# Patient Record
Sex: FEMALE | Race: BLACK OR AFRICAN AMERICAN | Employment: FULL TIME | URBAN - METROPOLITAN AREA
[De-identification: names, ages, dates, MRNs, and addresses within clinical notes are randomized per-mention and may not be internally consistent; named-entity substitution may affect disease eponyms.]

---

## 2020-02-03 ENCOUNTER — HOSPITAL ENCOUNTER (OUTPATIENT)
Dept: GENERAL RADIOLOGY | Age: 55
Discharge: HOME | End: 2020-02-03
Payer: COMMERCIAL

## 2020-02-03 DIAGNOSIS — M25.569 KNEE JOINT PAIN: ICD-10-CM

## 2020-02-03 PROCEDURE — 73565 X-RAY EXAM OF KNEES: CPT

## 2020-02-03 PROCEDURE — 73560 X-RAY EXAM OF KNEE 1 OR 2: CPT

## 2021-07-13 ENCOUNTER — OFFICE VISIT (OUTPATIENT)
Dept: SURGERY | Age: 56
End: 2021-07-13
Payer: COMMERCIAL

## 2021-07-13 VITALS
HEIGHT: 64 IN | RESPIRATION RATE: 18 BRPM | SYSTOLIC BLOOD PRESSURE: 177 MMHG | TEMPERATURE: 98 F | BODY MASS INDEX: 42.68 KG/M2 | HEART RATE: 77 BPM | OXYGEN SATURATION: 97 % | WEIGHT: 250 LBS | DIASTOLIC BLOOD PRESSURE: 98 MMHG

## 2021-07-13 DIAGNOSIS — M25.562 CHRONIC PAIN OF BOTH KNEES: ICD-10-CM

## 2021-07-13 DIAGNOSIS — R53.82 CHRONIC FATIGUE: ICD-10-CM

## 2021-07-13 DIAGNOSIS — E55.9 VITAMIN D DEFICIENCY: ICD-10-CM

## 2021-07-13 DIAGNOSIS — R01.1 HEART MURMUR: ICD-10-CM

## 2021-07-13 DIAGNOSIS — R63.5 EXCESSIVE WEIGHT GAIN: ICD-10-CM

## 2021-07-13 DIAGNOSIS — E03.9 ACQUIRED HYPOTHYROIDISM: ICD-10-CM

## 2021-07-13 DIAGNOSIS — G89.29 CHRONIC PAIN OF BOTH KNEES: ICD-10-CM

## 2021-07-13 DIAGNOSIS — D53.9 ANEMIA ASSOCIATED WITH NUTRITIONAL DEFICIENCY: ICD-10-CM

## 2021-07-13 DIAGNOSIS — E66.01 CLASS 3 SEVERE OBESITY DUE TO EXCESS CALORIES WITH SERIOUS COMORBIDITY AND BODY MASS INDEX (BMI) OF 40.0 TO 44.9 IN ADULT (HCC): Primary | ICD-10-CM

## 2021-07-13 DIAGNOSIS — M79.89 LEG SWELLING: ICD-10-CM

## 2021-07-13 DIAGNOSIS — E78.00 HIGH CHOLESTEROL: ICD-10-CM

## 2021-07-13 DIAGNOSIS — S89.92XA INJURY OF LEFT KNEE, INITIAL ENCOUNTER: ICD-10-CM

## 2021-07-13 DIAGNOSIS — I10 ESSENTIAL HYPERTENSION: ICD-10-CM

## 2021-07-13 DIAGNOSIS — M25.561 CHRONIC PAIN OF BOTH KNEES: ICD-10-CM

## 2021-07-13 DIAGNOSIS — E78.5 DYSLIPIDEMIA: ICD-10-CM

## 2021-07-13 DIAGNOSIS — G47.33 OBSTRUCTIVE SLEEP APNEA SYNDROME: ICD-10-CM

## 2021-07-13 PROCEDURE — 99205 OFFICE O/P NEW HI 60 MIN: CPT | Performed by: FAMILY MEDICINE

## 2021-07-13 RX ORDER — MELOXICAM 15 MG/1
15 TABLET ORAL DAILY
COMMUNITY
End: 2021-10-08

## 2021-07-13 RX ORDER — ESTRADIOL 0.5 MG/1
0.5 TABLET ORAL DAILY
COMMUNITY
End: 2021-10-08

## 2021-07-13 RX ORDER — CHOLECALCIFEROL TAB 125 MCG (5000 UNIT) 125 MCG
5000 TAB ORAL DAILY
COMMUNITY
End: 2021-08-18 | Stop reason: DRUGHIGH

## 2021-07-13 RX ORDER — ATORVASTATIN CALCIUM 20 MG/1
20 TABLET, FILM COATED ORAL DAILY
COMMUNITY

## 2021-07-13 RX ORDER — DICLOFENAC SODIUM 10 MG/G
GEL TOPICAL
COMMUNITY
Start: 2021-06-24 | End: 2022-05-29

## 2021-07-13 RX ORDER — LOSARTAN POTASSIUM 50 MG/1
50 TABLET ORAL DAILY
COMMUNITY
End: 2021-11-15

## 2021-07-13 RX ORDER — FUROSEMIDE 40 MG/1
60 TABLET ORAL DAILY
COMMUNITY
End: 2021-10-08

## 2021-07-13 RX ORDER — LEVOTHYROXINE SODIUM 175 UG/1
175 TABLET ORAL DAILY
COMMUNITY

## 2021-07-13 NOTE — LETTER
NOTIFICATION RETURN TO WORK / SCHOOL    7/13/2021 11:21 AM    Ms. Karla Robert  [address] [id number]      To Whom It May Concern:    Karla Robert is currently under the care of Chester Jackson    She will return to work/school on: 7/13/2021    If there are questions or concerns please have the patient contact our office.         Sincerely,      Linda Walton, DO

## 2021-07-13 NOTE — PATIENT INSTRUCTIONS
Grief (Actual/Anticipated): Care Instructions  Your Care Instructions     Grief is your emotional reaction to a major loss. The words \"sorrow\" and \"heartache\" often are used to describe feelings of grief. You feel grief when you lose a beloved person, pet, place, or thing. It is also natural to feel grief when you lose a valued way of life, such as a job, marriage, or good health. You may begin to grieve before a loss occurs. You may grieve for a loved one who is sick and dying. Children and adults often feel the pain of loss before a big move or divorce. This type of grief helps you get ready for a loss. Grief is different for each person. There is no \"normal\" or \"expected\" period of time for grieving. Some people adjust to their loss within a couple of months. Others may take 2 years or longer, especially if their lives were changed a lot or if the loss was sudden and shocking. Grieving can cause problems such as headaches, loss of appetite, and trouble with thinking or sleeping. You may withdraw from friends and family and behave in ways that are unusual for you. Grief may cause you to question your beliefs and views about life. Grief is natural and does not require medical treatment. But if you have trouble sleeping, it may help to take sleeping pills for a short time. It may help to talk with people who have been through or are going through similar losses. You may also want to talk to a counselor about your feelings. Talking about your loss, sharing your cares and concerns, and getting support from others are important parts of healthy grieving. Follow-up care is a key part of your treatment and safety. Be sure to make and go to all appointments, and call your doctor if you are having problems. It's also a good idea to know your test results and keep a list of the medicines you take. How can you care for yourself at home? · Get enough sleep. Your mind helps make sense of your life while you sleep. Missing sleep can lead to illness and make it harder for you to deal with your grief. · Eat healthy foods. Try to avoid eating only foods that give you comfort. Ask someone to join you for a meal if you do not like eating alone. Consider taking a multivitamin every day. · Get some exercise every day. Even a walk can help you deal with your grief. Other exercises, such as yoga, can also help you manage stress. · Comfort yourself. Take time to look at photos or use special items that make you feel better. · Stay involved in your life. Do not withdraw from the activities you enjoy. People you know at work, [employer], clubs, or other groups can help you get through your period of grief. · Think about joining a support group to help you deal with your grief. There are many support groups to help people recover from grief. When should you call for help? Call 911 anytime you think you may need emergency care. For example, call if:    · You feel you cannot stop from hurting yourself or someone else. Watch closely for changes in your health, and be sure to contact your doctor if:    · You think you may be depressed.     · You do not get better as expected. Where can you learn more? Go to http://www.gray.com/  Enter H249 in the search box to learn more about \"Grief (Actual/Anticipated): Care Instructions. \"  Current as of: July 17, 2020               Content Version: 12.8  © 5227-0162 [employer]. Care instructions adapted under license by [employer] (which disclaims liability or warranty for this information). If you have questions about a medical condition or this instruction, always ask your healthcare professional. [place] [address] any warranty or liability for your use of this information. Congratulations on starting the Low Calorie Diet!      Consume 2-3 meal replacements a day and 1 \"grocery\" meal.  The \"grocery\" meal should primarily consist of protein and green vegetables. Please meet with the dietician to learn how to calculate your total calories daily and/or use one of the suggested Mobile Apps listed in your patient instructions today. Your total calories consumed each day should not exceed 1,000-1,200 kcalories. 1. If you experience any new syptoms once you start this dietary approach, refer to your [facility] Program  Patient Manual for a list of all potential side effects of the LCD and recommendations for what to do to improve or resolved the negative side effect. For constipation, do not allow more than 3 days to pass you without having a bowel movement. As mentioned at your provider monthly visit, you can try taking OTC Magnesium 400 mg at bedtime for muscle cramping, difficulty sleeping or constipation or try Milk of Magnesia, Miralax, or Smooth Move Tea for constipation. If you have kidney disease, try OTC Colace instead. Please make sure you are consuming a minimal of 2 liter (67 oz) and up to half your body weight in ounces of water every day to reduce risk experiencing the potential negative side effects. 2. If you experience new dizziness and have consumed your proper water intake, you may drink one 8 oz coffee mug of broth or a bouillon cube in water. 3. Remember to get your labs drawn with your preferred laboratory one week prior to your 3rd monthly visit with your provider. 4. Attend the required nurse triage weigh-ins every other week at the office. Make sure homework sheets are completed prior to arrival or you will not be seen and cannot  meal products. 5. Have a reliable scale and try to use the same scale each time you weigh at home. Best weights are yielded when you weight without clothing or shoes and measure before the first meal of the day. 6. Attend the weekly nutritional meetings on Thursdays with the dietician virtually as scheduled.      7.  If you plan to schedule your next 2 appointments with your provider using a virtual platform, please have a reliable blood pressure cuff and scale available. You will be asked to report your weight, blood pressure and heart rate at the time of checking in with the nurse that day. [address], , at [phone] with any questions or concerns related to the program.       Again, WELCOME TO [facility] [place]! We are thrilled you have chosen us to take this weight loss journey with you and honored to do so. We look forward to working very closely with you as you achieve a healthier life. Many blessings! WEIGHT LOSS PLAN    1. Read food labels and count calories. Goal 3621-3850 calories daily for women and 0858-2339 calories daily for men. Achieve this by decreasing caloric intake by 500 calories by weekly increments. NOTE:  1 pound = 3500 calories! [email or web address]  [email or web address]  [email or web address]  [email or web address] gov  Weight watchers mobile    Calories needed to lose 1-2 pounds a week = 10 x your weight in pounds    2. Increase water intake. Per [facility], it is important to drink 1/2 your body weight in ounces of water daily. Decrease your water consumption 2-3 hours before bedtime to prevent sleep disturbance from frequent urination. 3.  Decrease sugary beverages. Each can or glass of soda increases your risk of obesity by 60%. Can lose 10 pounds in a month by avoiding any soda. 12 oz can of soda = 140 calories, 16 oz cup of sweet tea = 200 calories    16 oz orange juice = 200 calories, 10 oz apple juice = 150 calories   32 oz sports drink = 200 calories, 16 oz punch = 240 calories   3.5 oz alcohol = 100-150 calories     4. Avoid High Fructose Corn Syrup products. This ingredient makes products highly addictive! 5. Exercise 30 minutes daily 5 days weekly, minimally. If you burn 3500 calories that equals a pound!   Use a pedometer to count steps. Visit www. [employer] for a free pedometer and diet recommendations. Your maximum heart rate = 220 - your age    Never exercise at your maximum heart rate. See handout for target heart rate. 6.  Decrease carbohydrates (white bread, pasta, rice, potatoes, sweet foods and sweet drinks like soda, tea, coffee, juice and sports drinks). Increase fiber and protein. Goal:   calories daily of carbohydrates     Try CHROMIUM PICONATE 200 MG THREE TIMES DAILY,    to decrease Premenstrual carbohydrate cravings. 7.  Eat 3-5 small meals daily, include lots of protein (beans/legumes, nuts, lean meat, eggs) and green vegetables with each. (Breakfast, lunch, dinner with 2 healthy snacks)    8. Get proper rest 7-8 hours uninterrupted. When you get less than 6 hours, it triggers hunger by affecting your Grehlin:Leptin ratio and this results in weight gain. 9.  Watch your food portions. Green leafy vegetables should cover 1/2 of the plate, lean meat 1/4 of the plate, starchy vegetable 1/4 of the plate. Use smaller plates. 10.  Do not eat until you are full. Eat until you are no longer hungry. If you are not sure, try drinking a glass of water before getting your second serving of food. 11.  Do not weigh yourself daily. Wait until your next office visit. Use how you feel and  how your clothes fit as measurements of success. 12.  Address your spirituality to draw strength from above during your journey. Remember \"I am fearfully and wonderfully made. Marvelous in His eyes. \"    13. Set realistic, appropriate and achievable weight loss goals:     RECOMMENDED TARGET WEIGHT LOSS:  Initial weight loss of 5-10% of your initial body weight achieved over 6 months or a decrease of 2 BMI units. MINIMUM GOAL OF WEIGHT LOSS:  Reduce body weight and maintain a lower body weight. Prevent weight gain. RELATED WEBSITES:      Www.obesityaction. org  (and consider joining the Obesity [employer] Coalition for $25/year. The [address] mission is to elevated and empower those affected by obesity through education, advocacy and support. Quarterly journals included in membership fee. )    Www.[employer]. TrackR  www.[employer].com     RELATED DIETS:  Dr. [facility] Weight loss challenge, Mediterranean diet, [facility] Watchers, Leatha Diet (anti-inflammatory diet)

## 2021-07-26 ENCOUNTER — CLINICAL SUPPORT (OUTPATIENT)
Dept: SURGERY | Age: 56
End: 2021-07-26

## 2021-07-26 VITALS
SYSTOLIC BLOOD PRESSURE: 150 MMHG | HEIGHT: 64 IN | BODY MASS INDEX: 43.24 KG/M2 | WEIGHT: 253.3 LBS | DIASTOLIC BLOOD PRESSURE: 78 MMHG

## 2021-07-26 DIAGNOSIS — Z76.89 ENCOUNTER FOR WEIGHT MANAGEMENT: Primary | ICD-10-CM

## 2021-07-26 LAB
25(OH)D3+25(OH)D2 SERPL-MCNC: 22.6 NG/ML (ref 30–100)
ALBUMIN SERPL-MCNC: 4.4 G/DL (ref 3.8–4.9)
ALBUMIN/GLOB SERPL: 1.8 {RATIO} (ref 1.2–2.2)
ALP SERPL-CCNC: 99 IU/L (ref 48–121)
ALT SERPL-CCNC: 31 IU/L (ref 0–32)
AST SERPL-CCNC: 25 IU/L (ref 0–40)
BILIRUB SERPL-MCNC: 0.8 MG/DL (ref 0–1.2)
BUN SERPL-MCNC: 11 MG/DL (ref 6–24)
BUN/CREAT SERPL: 12 (ref 9–23)
CALCIUM SERPL-MCNC: 9.7 MG/DL (ref 8.7–10.2)
CHLORIDE SERPL-SCNC: 103 MMOL/L (ref 96–106)
CHOLEST SERPL-MCNC: 218 MG/DL (ref 100–199)
CHOLEST SERPL-MCNC: 229 MG/DL (ref 100–199)
CO2 SERPL-SCNC: 25 MMOL/L (ref 20–29)
CREAT SERPL-MCNC: 0.89 MG/DL (ref 0.57–1)
ERYTHROCYTE [DISTWIDTH] IN BLOOD BY AUTOMATED COUNT: 14.3 % (ref 11.7–15.4)
FOLATE SERPL-MCNC: 6.4 NG/ML
GLOBULIN SER CALC-MCNC: 2.5 G/DL (ref 1.5–4.5)
GLUCOSE SERPL-MCNC: 89 MG/DL (ref 65–99)
HBA1C MFR BLD: 5.4 % (ref 4.8–5.6)
HCT VFR BLD AUTO: 39.6 % (ref 34–46.6)
HDL SERPL-SCNC: 32.9 UMOL/L
HDLC SERPL-MCNC: 46 MG/DL
HDLC SERPL-MCNC: 49 MG/DL
HGB BLD-MCNC: 13.1 G/DL (ref 11.1–15.9)
INSULIN SERPL-ACNC: 22.6 UIU/ML (ref 2.6–24.9)
IRON SERPL-MCNC: 57 UG/DL (ref 27–159)
LDL SERPL QN: 20.6 NM
LDL SERPL-SCNC: 2240 NMOL/L
LDL SMALL SERPL-SCNC: 1327 NMOL/L
LDLC SERPL CALC-MCNC: 153 MG/DL (ref 0–99)
LDLC SERPL CALC-MCNC: 162 MG/DL (ref 0–99)
LP-IR SCORE SERPL: 59
MAGNESIUM SERPL-MCNC: 2 MG/DL (ref 1.6–2.3)
MCH RBC QN AUTO: 27 PG (ref 26.6–33)
MCHC RBC AUTO-ENTMCNC: 33.1 G/DL (ref 31.5–35.7)
MCV RBC AUTO: 82 FL (ref 79–97)
NT-PROBNP SERPL-MCNC: 140 PG/ML (ref 0–249)
POTASSIUM SERPL-SCNC: 4.5 MMOL/L (ref 3.5–5.2)
PROT SERPL-MCNC: 6.9 G/DL (ref 6–8.5)
RBC # BLD AUTO: 4.85 X10E6/UL (ref 3.77–5.28)
SODIUM SERPL-SCNC: 140 MMOL/L (ref 134–144)
T4 FREE SERPL-MCNC: 1.91 NG/DL (ref 0.82–1.77)
THYROPEROXIDASE AB SERPL-ACNC: 9 IU/ML (ref 0–34)
TRIGL SERPL-MCNC: 103 MG/DL (ref 0–149)
TRIGL SERPL-MCNC: 103 MG/DL (ref 0–149)
TSH SERPL DL<=0.005 MIU/L-ACNC: 0.03 UIU/ML (ref 0.45–4.5)
URATE SERPL-MCNC: 4.2 MG/DL (ref 3–7.2)
VIT B12 SERPL-MCNC: 304 PG/ML (ref 232–1245)
VLDLC SERPL CALC-MCNC: 19 MG/DL (ref 5–40)
WBC # BLD AUTO: 7.9 X10E3/UL (ref 3.4–10.8)

## 2021-07-26 NOTE — LETTER
NOTIFICATION RETURN TO WORK / SCHOOL    7/26/2021 10:05 AM    Ms. Obinna Norton  [address] [address]      To Whom It May Concern:    Obinna Norton is currently under the care of Chester Jackson. She will return to work/school on: 07/26/2021    If there are questions or concerns please have the patient contact our office.         Sincerely,      Jimmy Boucher M.D.

## 2021-07-26 NOTE — PROGRESS NOTES
1. Have you been to the ER, urgent care clinic since your last visit? Hospitalized since your last visit? No     2. Have you seen or consulted any other health care providers outside of the [address] [place] [address] since your last visit? Include any pap smears or colon screening. No     Patient aware of BP just started taking BP medication a few days ago. Patient did not have homework sheets.

## 2021-07-31 NOTE — PROGRESS NOTES
[address] [address] [address] [place] [address] 22.  [phone] o  [phone] f    INITIAL PHYSICIAN VISIT    PCP:   VERONIKA oGnzalez  Referred by:  [facility]/Dr. Antoinette Iglesias    Patient Status: New  Patient's weight management approach preference today:  LCD    HISTORY OF PRESENT ILLNESS  Agree with nurse notes. Alexandrea Mota is a 49 y.o. female with Obesity Class 3 Body mass index is 42.91 kg/m². presents for evaluation and treatment. How did you hear about this weight management program: She needs BL TKR. Must get her BMI lower to have it done. Dr. Blanquita Buenrostro referred her to our program.  Are you ready to start participating in this weight loss journey and reason(s) why: yes, \"I need knee surgery and want to get back to myself. \"  Any potential unsupportive people in your life: yes    Goals for participation in weight management program:   175 lbs and to have knee surgery    Weight Loss History  Lowest weight in lifetime/date: size 6 (down from size 14) in   Highest weight in lifetime/date:   254 lbs, 50 yo  Starting weight today:  250 lbs    Number of previous weight loss attempts: 2  Previous weight loss programs: self imposed, Weight Watchers and lost 40 lbs by drinking Smoothies, exercising 2x daily x 1 hour, stopped drinking Pepsi  Which were most successful/why: as above  Contributing factors to weight gain:  Stress, new health conditions like Hypothyroid after thyroid was removed because of benign nodules in . With birth of her son her weight increased from 150 lbs to 200 lbs and continues to increase . Had worked hard to get weight down to 185 lbs in  but re-gained weight once I injured by knee in 2016 and could no longer exercise.     Past Surgical History:   Procedure Laterality Date    HX BUNIONECTOMY      HX  SECTION      HX COLONOSCOPY 2018    [facility]. w colon polypectomy. Due q 5 yrs.  HX HYSTERECTOMY  2017    HX THYROIDECTOMY  2006    due to benign nodules. Previous Weight Loss Surgery:  no Date of Surgery:  na    Family History  Has anyone in your family had weight loss surgery: no  Do you have a family history of Obesity- yes, Kidney stones- no, Gallstones- no, Diabetes- yes, Heart Disease- yes, Sleep Apnea-  no, Gout- yes  Family History   Problem Relation Age of Onset    Heart Disease Mother         CHF    Hypertension Mother     Diabetes Father     Heart Disease Father     Hypertension Father     Gout Father     Heart Attack Father 80    Diabetes Paternal Grandfather     Obesity Paternal Grandmother     Heart Disease Sister         heart palpitations    No Known Problems Maternal Grandmother     No Known Problems Maternal Grandfather        Associated Medical Conditions  Patient denies any contraindications to participation in LCD or VLCD including: history of MI in the last 3 months, Type 1 DM, Type 2 DM, Liver or Kidney disease requiring protein restriction, Recent treatment for Cancer, History of Uric-acid Kidney Stone, Gout, Gall stones, Recent onset of Inflammatory Bowel Disease, or severe Food Allergies. Past Medical History:   Diagnosis Date    Anemia 2016    due to fibroids.     Asthma     Degenerative arthritis of knee, bilateral     Dr. Keshia Long High cholesterol     Hypertension 2014    Left knee injury 2016    Dr. Lauren Che    Leg swelling     Bilaterally    Sleep apnea     on cpap    Uterine fibroid     Vitamin D deficiency        Behavioral Health History  DEPRESSION SCREENING:    3 most recent PHQ Screens 7/26/2021   Little interest or pleasure in doing things Not at all   Feeling down, depressed, irritable, or hopeless -   Total Score PHQ 2 -       Any history of mental health conditions (including Depression, Anxiety, Anorexia, Bulimia or Binge Eating disorder): no  Treating provider and medication(s):na  Is it controlled: na  History of drug abuse or dependence:  no    Do you have any current major lifestyle changes or stressors:   Yes. \"Today is my mom's birthday. She  18 due to CHF. Stepfather's anniversary death is tomorrow. \"  Move from [place] to [facility], [address] [facility] in 2016. Conflict w an employee at work yesterday. Weight Loss Medication History  Previous prescription, OTC weight loss medications or herbal remedies/supplements: yes, OTC in . Pt can not recall the name. Negative side effects: 0  Pounds lost with use:  ? lbs    Current Outpatient Medications   Medication Sig    levothyroxine (SYNTHROID) 175 mcg tablet Take 175 mcg by mouth daily.  diclofenac (VOLTAREN) 1 % gel Apply  to affected area three (3) times daily as needed.  atorvastatin (LIPITOR) 20 mg tablet Take 20 mg by mouth daily. Indications: high cholesterol    furosemide (LASIX) 40 mg tablet Take 60 mg by mouth daily. For leg swelling    cholecalciferol (VITAMIN D3) (5000 Units/125 mcg) tab tablet Take 5,000 Units by mouth daily.  estradioL (ESTRACE) 0.5 mg tablet Take 0.5 mg by mouth daily.  meloxicam (MOBIC) 15 mg tablet Take 15 mg by mouth daily.  losartan (COZAAR) 50 mg tablet Take 50 mg by mouth daily. No current facility-administered medications for this visit. Medications that cause weight gain:  Estrace, Mobic, Cozaar  Medications that cause weight loss:  0  Recent changes to medications:  Saw pcp for hypothyroid, Synthroid dose was reduced from 212 mg daily to 175 mg day. Tolerating it well. Pt stopped taking Losartan.     Allergies   Allergen Reactions    Ciprofloxacin Other (comments)     Pain     Doxycycline Other (comments)     Stomach pain    Erythromycin Other (comments)     Stomach pain    Esgic [Butalbital-Acetaminophen-Caff] Other (comments)     Stomach pain       Mobile Apps used for meals, water consumption, sleep, physical activity: 0    Eating Habits  Total calories consumed per day:  ? kcal    Number of meals consumed per day on average: 2  Number of times per week fast food or meals at/from restaurants is consumed: 5    Typical Meals:       Breakfast: Starbucks Ham and swiss croissant w caramel ribbon crunch or Jovon latte      Lunch:  Chicken and green beans      Dinner: skip due to exhaustion      Snacks: \"I am a snackaholic. \"  Salt and pepper chips, pretzels    Barriers to eating healthy meals:  Exhaustion. Drinking Habits  How much water do you consume daily: 4 16.9 oz/day    How much caffeine do you drink daily: 2 16 oz Pepsi/day   Alcohol use:  Socially, maybe once q 2 months   Any other sugar-sweetened beverages daily (sodas, teas, juices, etc.): 0  Barriers to consuming at least 2 L water daily: busy at work, no set lunch time, not grocery shopping     Social History     Tobacco Use    Smoking status: Passive Smoke Exposure - Never Smoker    Smokeless tobacco: Never Used    Tobacco comment: lived with smoker mom x years til 2009   Vaping Use    Vaping Use: Never used   Substance Use Topics    Alcohol use: Yes     Comment: occasionaly    Drug use: Never         Sleep Habits  How many hours of sleep nightly: 7 hours  Rx or OTC Sleep aids used:  0  Occupation:  [employer]  Do you work night shift:  no  Work hours:  7:15a-8/8:30p  Any eating while asleep:  no  Any snoring:  no  Daytime naps:  no  Any diagnosis of Sleep Apnea by a physician:   Yes since 2017   Do you use a CPAP machine? yes  Sleep Medicine Specialist:  Pulmonologist  Barriers to getting proper rest:  0    Exercise History  Type of physical activity/ preferred time of day:  Walking on treadmill  Physical Activity is performed 0 days a week for 0 minutes 0 time(s) a day.     Do you own a pedometer or fitness tracker: no   Average number steps per day:  ?000  Gym membership owned:  no Actively utilizing the gym:  no  Pets owned:  ?  Is physical activity enjoyable: Na    Have you ever been told by a physician or anyone else not to exercise: no  Barriers limiting physical activity:  BL knee pain    OB/GYN History (For Female Patients)  Social History     Substance and Sexual Activity   Sexual Activity Yes    Partners: Male    Birth control/protection: Surgical     OB History        1    Para        Term                AB        Living   1       SAB        TAB        Ectopic        Molar        Multiple        Live Births   1               Contraception:  NEENA  Menopause:  surgical     Are you pregnant or planning on becoming pregnant within 6 months:  No      Other Medical Care and Concerns  Pt has his of hypothyroid disorder. PCP is managing it. Reduced her Synthroid from 212 mg to 175 mg daily. Pt has a hx of Hypertension and BL leg swelling, worse with standing. She stopped Losartan and Lasiz 60 mg daily x 1 yr when her conditions stabilized after her thyroid was removed. She has a hx of vit D deficiency. Txd with supplementation. She has a hx of BL knee pain due to severe OA. Sees Dr. Rosangela Storm on 21. Txd w Voltaren and PT. She has a hx of L knee injury. Do you have upcoming travel in the next 6 weeks:  Yes. To Son's 21st birthday and [place] trip. If so, contact the dietician for meal plan modification and recommendations. Immunization History   Administered Date(s) Administered    COVID-19, PFIZER, MRNA, LNP-S, PF, 30MCG/0.3ML DOSE 2021, 2021       Health Maintenance  A1C:  SEE RESULTS BELOW. Lipid panel:  SEE RESULTS BELOW. Colonoscopy:    w colon polypectomy, due q 5 yrs  Depression screening:  Performed today  Mammogram:  ?  Annual Wellness Visit:  To be performed by pcp annually, when appropriate. ROS:  Review of Systems negative except as noted above in HPI.     PHYSICAL EXAMINATION    Visit Vitals  BP (!) 177/98 (BP 1 Location: Right arm, BP Patient Position: Sitting) Comment: Stopped taking BP medications   Pulse 77   Temp 98 °F (36.7 °C) (Oral)   Resp 18   Ht 5' 4\" (1.626 m)   Wt 250 lb (113.4 kg)   SpO2 97%   BMI 42.91 kg/m²          Neck Circumference:  15 in - Acceptable range for M >16 inches, F>15 inches  Waist Circumference:  42 in - Acceptable range for M> 40 inches/102 cm, F > 35 inches, 88 cm  Body Fat: 46.6% - Acceptable range for M 18-24%, F 25-31%      General appearance - Well nourished. Well appearing. Well developed. No acute distress. Obese. Head - Normocephalic. Atraumatic. Eyes - pupils equal and reactive. Extraocular eye movements intact. Sclera anicteric. Mildly injected sclera. Ears - Hearing is grossly normal bilaterally. Nose - normal and patent. No polyps noted. No erythema. No discharge. Mouth - mucous membranes with adequate moisture. Posterior pharynx normal with cobblestone appearance. No erythema, white exudate or obstruction. Neck - supple. Midline trachea. No carotid bruits noted bilaterally. No thyromegaly noted. Chest - clear to auscultation bilaterally anteriorly and posteriorly. No wheezes. No rales or rhonchi. Breath sounds are symmetrical bilaterally. Unlabored respirations. Heart - normal rate. Regular rhythm. Normal S1, S2.  3/6 murmur noted. No rubs, clicks or gallops noted. Abdomen - soft and distended. No masses or organomegaly. No rebound, rigidity or guarding. Bowel sounds normal x 4 quadrants. No tenderness noted. Neurological - awake, alert and oriented to person, place, and time and event. Cranial nerves II through XII intact. Clear speech. Muscle strength is +5/5 x 4 extremities. Sensation is intact to light touch bilaterally. Steady gait. Heme/Lymph - peripheral pulses normal x 4 extremities. Trace nonpitting BL leg peripheral edema is noted. Negative Dallin sign BL. Musculoskeletal - Intact x 4 extremities. Full ROM x 2 extremities, decreased in BL LEs. Mild BL knee pain with movement.     Back exam - normal range of motion. No pain on palpation of the spinous processes in the cervical, thoracic, lumbar, sacral regions. No CVA tenderness. No buffalo hump noted. Skin - no rashes, erythema, ecchymosis, lacerations, abrasions, suspicious moles noted. No skin tags or moles. No acanthosis nigricans noted in the axilla or neck. Psychological -   normal behavior, dress and thought processes. Good insight. Good eye contact. Normal affect. Appropriate mood. Normal speech. DATA REVIEWED     Lab Results:  Not available. Pt will get them from her pcp and specialists. EKG: Not available    ASSESSMENT     ICD-10-CM ICD-9-CM    1. Class 3 severe obesity due to excess calories with serious comorbidity and body mass index (BMI) of 40.0 to 44.9 in adult ([facility] [address]  E66.01 278.01     Z68.41 V85.41    2. Essential hypertension  I10 401.9 EKG, 12 LEAD, INITIAL      TSH 3RD GENERATION      URIC ACID    due to job stress yesterday and grief today, worse, usually stable without Losartan x 1 yr   3. Chronic fatigue  R53.82 780.79 EKG, 12 LEAD, INITIAL      CBC W/O DIFF      METABOLIC PANEL, COMPREHENSIVE      MAGNESIUM      VITAMIN B12 & FOLATE      VITAMIN D, 25 HYDROXY      NT-PRO BNP    due to stress vs thyroid fluctuations vs vit D vs other   4. Dyslipidemia  E78.5 272.4 HEMOGLOBIN A1C WITH EAG      INSULIN      LIPID PANEL      METABOLIC PANEL, COMPREHENSIVE   5. Excessive weight gain  R63.5 783.1 EKG, 12 LEAD, INITIAL      INSULIN      THYROID PEROXIDASE (TPO) AB    due to pregnancy vs food choices vs decreased physical activity vs thyroid vs vit D vs other   6. High cholesterol  E78.00 272.0 LIPID PANEL      NMR LIPOPROFILE WITH LIPIDS (WITHOUT GRAPH)   7. Obstructive sleep apnea syndrome  G47.33 327.23     stable on CPAP   8. Leg swelling  M79.89 729.81 NT-PRO BNP    Bilateral due to BP vs JEY vs BL knee OA vs other, worse with standing at work   9. Vitamin D deficiency  E55.9 268.9 VITAMIN D, 25 HYDROXY   10.  Anemia associated with nutritional deficiency  D53.9 281.9 IRON      VITAMIN B12 & FOLATE   11. Injury of left knee, initial encounter  S89. 92XA 959.7    12. Chronic pain of both knees  M25.561 719.46     M25.562 338.29     G89.29      due to BL OA   13. BMI 40.0-44.9, adult ([address]  Z68.41 V85.41    14. Acquired hypothyroidism  E03.9 244.9 T4, FREE      TSH 3RD GENERATION   15. Heart murmur  R01.1 785. 2        WEIGHT MANAGEMENT PLAN    Chart was reviewed and updated during the office visit today. Based on patient history, lab results, EKG and physical exam performed today in our office, Alexandrea Mota is a good candidate for the [employer] [facility] mSeller Management Program using the [employer] New Direction meal replacements for Low Calorie Dietary approach (0445-9984 kcal daily.)     Welcomed patient to our [employer] Program.      During this visit, patient met with Jeffrey Jacobs,  today to review and sign [facility] Commitment Form, Attendance Policy, and [facility] Agreement and Consent, further discuss program design, assist patient with ordering New Direction meal replacements and schedule initial 1:1 visit with dietician, mandatory weekly (VLCD 800 kcal/day)or biweekly (LCD/3903-9187 kcal/day) nurse visits. SEE SCANNED DOCUMENTS. Referred patient to Jenney Sandifer, RD for initial 30 minute 1:1 nutritional counseling, include discussions about how to count daily calories and stay within the recommended amount per day as agreed upon today, simple vs complex carbohydrates. Referred patient to [employer] Patient Manual to become familiar with potential side effects of this dietary approach and what to do if such symptoms occur. Encouraged patient to notify our office if any new symptoms develop and persist.      Discussed the patient's BMI, anthropometrics and goals.   The weight management follow up plan is as follows:     Dietary Prescription:  Ok to gradually start LCD (low calorie diet) with 6648-2454 calories consumed daily using 2-3 [employer] New Direction meal replacements after first slowly weaning off simple carbohydrates. Decrease carbohydrates gradually during first couple of weeks:  (white foods like potatoes, rice, pasta, bread, sweet foods, and sweet drinks including alcohol and Pepsi). Increase green leafy vegetables and protein (lean meats and legumes) with each meal.  Avoid fried foods and limit saturated fats. If blood pressure is elevated, limit sodium intake including hot sauce and soy sauce. Do not skip meals. Do not wait longer than 3-4 hours between meals to avoid experiencing possible low blood sugar episodes. Consume a minimum of 2 L (67 oz) of water daily up to half your body size in ounces of water, while utilizing this dietary approach. Avoid sugar-sweetened beverages. Limit caffeine, will allow 1 8 oz cup of black coffee or green tea (to stimulate release of Adiponectin and promote fat burning.)     Exercise Prescription: Minimal and as tolerated while using this dietary approach. Encouraged strength training, resistance or toning exercises daily to prevent muscle mass loss. Sleep Prescription: A goal of 7-9 hours of uninterrupted sleep is recommended to turn off the Grehlin hormone to be released from the stomach and triggers appetite while promoting weight gain. Proper rest turns on Leptin hormone to be released from white adipose tissue and promotes weight loss. Discussed snoring, symptoms of Sleep Apnea and improvements with weight loss. If you currently use CPAP or BiPAP, please continue its use whenever resting. Avoid caffeine 6-12 hours before bedtime. Limit screen time 1-2 hours before bedtime. Avoid exercising 2-3 hours before bedtime. Ok to try OTC Sleepy Time Tea, Chamomile tea or Magnesium 400 mg at bedtime for sleep, if receiving less than 7-8 hours nightly.     Emphasized the importance of patient continuing care from current primary care provider and other specialists while participating in this weight management program.  Patient has full access to all our office notes, orders, lab results on [facility] and can provide them copies, if desired. Advised patient to follow up with pcp for any acute symptoms and Health Maintenance. Continue current medications as directed by prescribers. Re-start Losartan daily since bp is elevated. Follow up with pcp if elevated bp persists or worsens. Identified and discussed weight positive and weight negative medications on patient's medication list.  Advised patient not to stop any use without discussing with the prescribing provider. Will monitor patient's weight and health with use. Supplement recommendations:  Start OTC Magnesium 400 mg po at night prn sleep, muscle cramping, constipation. Start OTC vit B12 1000 mcg daily orally if taking Metformin or experiencing numbness and tingling. Start OTC Fish Oil with EPA and DHA 1000 mg daily if experiencing dry skin, hair loss or low HDL. Use with caution if history of heartburn exists. Increase fiber supplements or try Fiber products if experiencing constipation. Take OTC Lactaid with meal replacements, if lactose intolerance history exists or symptoms begin. Refer to [facility] Patient Manual for additional considerations for side effects and notify me. Reviewed and discussed most recent lab and EKG results. If not available today, advised patient to sign a release to provide our program a copy of each from pcp or specialist.  Nikia Martinez pertinent labs monthly while participating in VLCD or every 3 months while participating in LCD, if using New Direction meal replacements, as discussed to identify electrolyte abnormalities and guide therapeutic approach. If labs have not been performed within the past 3-6 months, an order form for initial labs was given to patient today to be drawn one week prior to next appointment with me.   If an EKG has not been performed within the past 3-6 months, an order form was given to patient today to be performed after the office visit today to be done before starting the program.  Advised patient to sign up for [employer] and view labs directly. Notify me by [facility] if any questions or concerns arise. Labs ordered today will be reviewed in detail at the next office visit and time allowed for any questions regarding the results. Consider Echo if heart murmur persists and pt has not had one yet. She will check with her pcp. Counseled patient on health topics:  bp, leg edema, Obesity, Insulin Resistance, VLCD and LCD dietary approaches, benefits, contraindications, possible side effects to these diets and how to prevent poor outcomes (including staying hydrated, limit physical exercise while transitioning into this program, avoid skipping meals, etc), weight loss goals, sleep hygiene, barriers to losing weight and keeping weight off and stressors. Informed patient that weight loss goal of 5-10% in 6-12 months has shown significant improvement in obesity and its related health conditions including DM, heart disease, asthma. A key study published in Arthritis & Rheumatism of Overweight and Obese Adults with OA found that losing 1 pound of weight resulted in 4 pounds of pressure being removed from the knees. Elevate legs. Pedal pump exercises. Compression socks. To ER if notice redness, calf pain, SOB or chest pain. Immunizations noted. Covid vaccines recommended, if patient has not had it. Informed patient that Obesity may increase risk for severe illness and death from Covid-19 disease. Offered empathy, encouragement, legitimation, prayers, partnership to patient. Praised patient for successes. Patient was offered a choice/choices in the treatment plan today. Patient expresses understanding of the plan and agrees with recommendations.      Follow-up and Dispositions    · Return in about 1 month (around 8/13/2021) for LCD, results, referral follow up. Total time:  92 mins spent with patient in counseling, coordinating care, reviewing and discussing results, reviewing and completing relevant documentation, and discussing treatment plans in reference to The primary encounter diagnosis was Class 3 severe obesity due to excess calories with serious comorbidity and body mass index (BMI) of 40.0 to 44.9 in adult [facility]). Diagnoses of Essential hypertension, Chronic fatigue, Dyslipidemia, Excessive weight gain, High cholesterol, Obstructive sleep apnea syndrome, Leg swelling, Vitamin D deficiency, Anemia associated with nutritional deficiency, Injury of left knee, initial encounter, Chronic pain of both knees, BMI 40.0-44.9, adult ([address] Acquired hypothyroidism, and Heart murmur were also pertinent to this visit. Turner Shreya, PA FOR ALLOWING ME THE PRIVILEGE TO PARTICIPATE IN THE CARE OF OUR MUTUAL PATIENT, Ms. Contreras WITH RESPECT TO WEIGHT MANAGEMENT. Mike Dotson DO, Diplomate KECIA MAYER    Patient Instructions          Grief (Actual/Anticipated): Care Instructions  Your Care Instructions     Grief is your emotional reaction to a major loss. The words \"sorrow\" and \"heartache\" often are used to describe feelings of grief. You feel grief when you lose a beloved person, pet, place, or thing. It is also natural to feel grief when you lose a valued way of life, such as a job, marriage, or good health. You may begin to grieve before a loss occurs. You may grieve for a loved one who is sick and dying. Children and adults often feel the pain of loss before a big move or divorce. This type of grief helps you get ready for a loss. Grief is different for each person. There is no \"normal\" or \"expected\" period of time for grieving. Some people adjust to their loss within a couple of months.  Others may take 2 years or longer, especially if their lives were changed a lot or if the loss was sudden and shocking. Grieving can cause problems such as headaches, loss of appetite, and trouble with thinking or sleeping. You may withdraw from friends and family and behave in ways that are unusual for you. Grief may cause you to question your beliefs and views about life. Grief is natural and does not require medical treatment. But if you have trouble sleeping, it may help to take sleeping pills for a short time. It may help to talk with people who have been through or are going through similar losses. You may also want to talk to a counselor about your feelings. Talking about your loss, sharing your cares and concerns, and getting support from others are important parts of healthy grieving. Follow-up care is a key part of your treatment and safety. Be sure to make and go to all appointments, and call your doctor if you are having problems. It's also a good idea to know your test results and keep a list of the medicines you take. How can you care for yourself at home? · Get enough sleep. Your mind helps make sense of your life while you sleep. Missing sleep can lead to illness and make it harder for you to deal with your grief. · Eat healthy foods. Try to avoid eating only foods that give you comfort. Ask someone to join you for a meal if you do not like eating alone. Consider taking a multivitamin every day. · Get some exercise every day. Even a walk can help you deal with your grief. Other exercises, such as yoga, can also help you manage stress. · Comfort yourself. Take time to look at photos or use special items that make you feel better. · Stay involved in your life. Do not withdraw from the activities you enjoy. People you know at work, Hindu, clubs, or other groups can help you get through your period of grief. · Think about joining a support group to help you deal with your grief. There are many support groups to help people recover from grief. When should you call for help?    Call [phone] anytime you think you may need emergency care. For example, call if:    · You feel you cannot stop from hurting yourself or someone else. Watch closely for changes in your health, and be sure to contact your doctor if:    · You think you may be depressed.     · You do not get better as expected. Where can you learn more? Go to http://www.gray.com/  Enter H249 in the search box to learn more about \"Grief (Actual/Anticipated): Care Instructions. \"  Current as of: July 17, 2020               Content Version: 12.8  © 1961-5948 [employer]. Care instructions adapted under license by "[employer] (which disclaims liability or warranty for this information). If you have questions about a medical condition or this instruction, always ask your healthcare professional. [address] any warranty or liability for your use of this information. Congratulations on starting the Low Calorie Diet! Consume 2-3 meal replacements a day and 1 \"grocery\" meal.  The \"grocery\" meal should primarily consist of protein and green vegetables. Please meet with the dietician to learn how to calculate your total calories daily and/or use one of the suggested Mobile Apps listed in your patient instructions today. Your total calories consumed each day should not exceed 1,000-1,200 kcalories. 1. If you experience any new syptoms once you start this dietary approach, refer to your [facility] Direction Program  Patient Manual for a list of all potential side effects of the LCD and recommendations for what to do to improve or resolved the negative side effect. For constipation, do not allow more than 3 days to pass you without having a bowel movement.   As mentioned at your provider monthly visit, you can try taking OTC Magnesium 400 mg at bedtime for muscle cramping, difficulty sleeping or constipation or try Milk of Magnesia, Miralax, or Smooth Move Tea for constipation. If you have kidney disease, try OTC Colace instead. Please make sure you are consuming a minimal of 2 liter (67 oz) and up to half your body weight in ounces of water every day to reduce risk experiencing the potential negative side effects. 2. If you experience new dizziness and have consumed your proper water intake, you may drink one 8 oz coffee mug of broth or a bouillon cube in water. 3. Remember to get your labs drawn with your preferred laboratory one week prior to your 3rd monthly visit with your provider. 4. Attend the required nurse triage weigh-ins every other week at the office. Make sure homework sheets are completed prior to arrival or you will not be seen and cannot  meal products. 5. Have a reliable scale and try to use the same scale each time you weigh at home. Best weights are yielded when you weight without clothing or shoes and measure before the first meal of the day. 6. Attend the weekly nutritional meetings on Thursdays with the dietician virtually as scheduled. 7.  If you plan to schedule your next 2 appointments with your provider using a virtual platform, please have a reliable blood pressure cuff and scale available. You will be asked to report your weight, blood pressure and heart rate at the time of checking in with the nurse that day. [address], , at [phone] with any questions or concerns related to the program.       Again, WELCOME TO THE [facility] [place]! We are thrilled you have chosen us to take this weight loss journey with you and honored to do so. We look forward to working very closely with you as you achieve a healthier life. Many blessings! WEIGHT LOSS PLAN    1. Read food labels and count calories. Goal 2929-0080 calories daily for women and 4470-7038 calories daily for men. Achieve this by decreasing caloric intake by 500 calories by weekly increments. NOTE:  1 pound = 3500 calories! Www.loseit. com  Www.myfitnesspal.com  Www.Hubs1. [employer]  Www.Glider. gov  Weight watchers mobile    Calories needed to lose 1-2 pounds a week = 10 x your weight in pounds    2. Increase water intake. Per [facility] Weight loss Program, it is important to drink 1/2 your body weight in ounces of water daily. Decrease your water consumption 2-3 hours before bedtime to prevent sleep disturbance from frequent urination. 3.  Decrease sugary beverages. Each can or glass of soda increases your risk of obesity by 60%. Can lose 10 pounds in a month by avoiding any soda. 12 oz can of soda = 140 calories, 16 oz cup of sweet tea = 200 calories    16 oz orange juice = 200 calories, 10 oz apple juice = 150 calories   32 oz sports drink = 200 calories, 16 oz punch = 240 calories   3.5 oz alcohol = 100-150 calories     4. Avoid High Fructose Corn Syrup products. This ingredient makes products highly addictive! 5. Exercise 30 minutes daily 5 days weekly, minimally. If you burn 3500 calories that equals a pound! Use a pedometer to count steps. Visit www. [employer] for a free pedometer and diet recommendations. Your maximum heart rate = 220 - your age    Never exercise at your maximum heart rate. See handout for target heart rate. 6.  Decrease carbohydrates (white bread, pasta, rice, potatoes, sweet foods and sweet drinks like soda, tea, coffee, juice and sports drinks). Increase fiber and protein. Goal:   calories daily of carbohydrates     Try CHROMIUM PICONATE 200 MG THREE TIMES DAILY,    to decrease Premenstrual carbohydrate cravings. 7.  Eat 3-5 small meals daily, include lots of protein (beans/legumes, nuts, lean meat, eggs) and green vegetables with each. (Breakfast, lunch, dinner with 2 healthy snacks)    8. Get proper rest 7-8 hours uninterrupted.   When you get less than 6 hours, it triggers hunger by affecting your Grehlin:Leptin ratio and this results in weight gain. 9.  Watch your food portions. Green leafy vegetables should cover 1/2 of the plate, lean meat 1/4 of the plate, starchy vegetable 1/4 of the plate. Use smaller plates. 10.  Do not eat until you are full. Eat until you are no longer hungry. If you are not sure, try drinking a glass of water before getting your second serving of food. 11.  Do not weigh yourself daily. Wait until your next office visit. Use how you feel and  how your clothes fit as measurements of success. 12.  Address your spirituality to draw strength from above during your journey. Remember \"I am fearfully and wonderfully made. Marvelous in His eyes. \"    13. Set realistic, appropriate and achievable weight loss goals:     RECOMMENDED TARGET WEIGHT LOSS:  Initial weight loss of 5-10% of your initial body weight achieved over 6 months or a decrease of 2 BMI units. MINIMUM GOAL OF WEIGHT LOSS:  Reduce body weight and maintain a lower body weight. Prevent weight gain. RELATED WEBSITES:      Www.obesityaction. org  (and consider joining the Obesity Action Coalition for $25/year. The [facility] mission is to elevated and empower those affected by obesity through education, advocacy and support. Quarterly journals included in membership fee. )    Www."[employer]"  www.[employer].com     RELATED DIETS:  Dr. Meaghan Duckworth Weight loss challenge, Mediterranean diet, [facility] Watchers, Paleo Diet (anti-inflammatory diet)

## 2021-08-05 ENCOUNTER — CLINICAL SUPPORT (OUTPATIENT)
Dept: SURGERY | Age: 56
End: 2021-08-05

## 2021-08-05 ENCOUNTER — OFFICE VISIT (OUTPATIENT)
Dept: SURGERY | Age: 56
End: 2021-08-05

## 2021-08-05 DIAGNOSIS — E66.01 MORBID OBESITY (HCC): Primary | ICD-10-CM

## 2021-08-05 DIAGNOSIS — E66.01 CLASS 3 SEVERE OBESITY DUE TO EXCESS CALORIES WITH SERIOUS COMORBIDITY AND BODY MASS INDEX (BMI) OF 40.0 TO 44.9 IN ADULT (HCC): Primary | ICD-10-CM

## 2021-08-05 NOTE — PROGRESS NOTES
[facility]  Metabolic Program Initial Nutrition Consult    Date: 2021   Physician: Dr. Hallie Swenson  Name: Lucy Aguilera  :  1965    Type of Plan: LCD  Weeks on Plan: Pt met with Dr. Luis Alberto Alamo on  but was just received cardiac clearance to start program- will begin on . Virtual visit was completed through [employer]. ASSESSMENT:    Past Medical History: HTN, hyperlipidemia, hypothyroid    Medications/Supplements:   Prior to Admission medications    Medication Sig Start Date End Date Taking? Authorizing Provider   levothyroxine (SYNTHROID) 175 mcg tablet Take 175 mcg by mouth daily. Provider, Historical   diclofenac (VOLTAREN) 1 % gel Apply  to affected area three (3) times daily as needed. 21   Provider, Historical   atorvastatin (LIPITOR) 20 mg tablet Take 20 mg by mouth daily. Indications: high cholesterol    Provider, Historical   furosemide (LASIX) 40 mg tablet Take 60 mg by mouth daily. For leg swelling    Provider, Historical   cholecalciferol (VITAMIN D3) (5000 Units/125 mcg) tab tablet Take 5,000 Units by mouth daily. Provider, Historical   estradioL (ESTRACE) 0.5 mg tablet Take 0.5 mg by mouth daily. Provider, Historical   meloxicam (MOBIC) 15 mg tablet Take 15 mg by mouth daily. Provider, Historical   losartan (COZAAR) 50 mg tablet Take 50 mg by mouth daily. Provider, Historical       Food Allergies/Intolerances: None    Anthropometrics:    Ht: 64 inches  Wt: 252 lbs (pt stated)   IBW: 120 lbs    %IBW:  210    BMI:43    Category: Obesity class III    Pt presents today for initial nutrition consult for the Guy Aguilar.      Reported weight history: Prior to pregnancy weight ~140-150 lbs; gained 50 lbs with pregnancy and wt continued to increase. Lost weight in  to 185 lbs but regained after knee injury in 2016; mother passed in 2018 causing more wt gain due to emotional eating.     Reports lowest adult BW of 140 lbs and highest adult BW of 252 lbs (present wt). Attributes wt gain over the years r/t pregnancy,  physical inactivity (due to injury), emotional eating(?) with loss of mother in 2018 . Has attempted wt loss through various methods with most successful wt loss of 30 lbs (through exercise and elimination of soda)    Exercise/Physical Activity: in PT- bike 10 min at therapy; awaiting knee surgery after weight loss achieved. Can walk for short distances or ride stationary bike. Started meal replacements: No  If yes, how many per day: N/A    Aversions/side effects to meal replacements: N/A    Reported Diet History: Weight watchers, stopped drinking soda/exercising (most successful)    24 Hour Diet Recall  Breakfast  Starbucks drink, croissant occasionally   Lunch 1:30 Veggies, chicken- take out   Dinner  No cooking- Avila soup    Snacks  Chips, nuts- \"snack-a-holic\" at work   [employer], soda, juice       Environment/Psychosocial/Support: Works FT+ as [employer].  supportive; works long hours frequently (picking up food on way home). NUTRITION INTERVENTION:  Pt educated on nutrition recommendations for LCD, specifically 2 meal replacements plus 1 meal and snack. Grocery meal:  Use the balanced plate method to plan meals, include 3-6 oz of lean source of protein, unlimited non-starchy vegetables, 1/2 cup whole grains/beans OR 1/2 cup fruit OR 1 serving of low fat dairy. Utilize handouts listing healthy snack and meal ideas. Read all nutrition labels. Demonstrated and emphasized identifying serving size, total fat, sugar and protein content. Defined low fat as </= 3 g per serving. Discussed lean and extra lean sources of protein. Provided list of low fat cooking methods. Avoid foods with sugar listed in the first 3 ingredients and >/10 g sugar per serving.      Practice mindful eating habits; take small bites, chew thoroughly, avoid distractions, utilize hunger/fullness scale. Attend Metabolic Weight Loss Class and Support Group and increase physical activity (approved per MD) for long term weight maintenance. NUTRITION MONITORING AND EVALUATION:    The following goals were established with patient;  1. Eliminate sugar sweetened beverages; consume 64 oz sugar free fluid daily  2. Start LCD on Sunday, August 8  3. Bring ND to work for lunch; pack snack and grocery meal for dinner- plan meals  4. Have product every 3-4 hours  5. Walk every other day at work      Specific tips and techniques to facilitate compliance with above recommendations were provided and discussed. If further details are desired please contact me at [phone]. This phone number was also provided to the patient for any further questions or concerns.           Nhan Her RD

## 2021-08-09 ENCOUNTER — CLINICAL SUPPORT (OUTPATIENT)
Dept: SURGERY | Age: 56
End: 2021-08-09

## 2021-08-09 VITALS
SYSTOLIC BLOOD PRESSURE: 162 MMHG | DIASTOLIC BLOOD PRESSURE: 98 MMHG | HEART RATE: 72 BPM | BODY MASS INDEX: 42.91 KG/M2 | WEIGHT: 250 LBS | OXYGEN SATURATION: 98 %

## 2021-08-09 DIAGNOSIS — E66.01 CLASS 3 SEVERE OBESITY DUE TO EXCESS CALORIES WITH SERIOUS COMORBIDITY AND BODY MASS INDEX (BMI) OF 40.0 TO 44.9 IN ADULT (HCC): Primary | ICD-10-CM

## 2021-08-09 NOTE — PROGRESS NOTES
Discussed BP with patient and ways to lower her bp. Patient was recently put back on her bp medication and monitors at home. Progress Note: Weekly Education Class in the [facility]     1) Patient is on Very Low Calorie Diet [] (4 meal replacements per day, 800 kcal/day)      Low Calorie Diet [x] (2-3 meal replacements per day, 6692-2700 kcal/day)    Did patient have any new symptoms or physical problems? Yes []   or  No [x]    If yes, check & comment: weakness [], fatigue [], lightheadedness [], headache [], cramps [], cold intolerance [], hair loss [], diarrhea [], constipation [],  NA [] other:                                 Has patient had any medical attention from other providers, urgent care or the emergency room this week? Yes []  or No [x]       NA [], If yes, why:                                         2) Number of meal replacements consumed daily? 0 (range)  NA []    Did you eat any food outside of the program? Yes [x] No []    3) Average ounces of water patient consumed daily this week (not including shakes)? 64     (divide the weekly total by 7)    Any other sugar sweetened beverages consumed this week? Yes []  No [x]    Did patient have any problems adhering to the diet? Yes [x]  No [] NA []    If yes, Vacation [], Celebrations [], Conferences [], Family Reunions [] other: Will get on track now that Im cleared. 4) How has patient mood overall been this week? Sad [], Happy [], Stressed [], Tired [x], Content [], NA [], other            5) Physical Activity Over the Past Week:    Cardio exercise:20 min  Strength exercise: 2 workouts / week  Number of steps walked per day: 34 miles      Medications reconciled by nurse Yes [x]  No[]    Patient was given therapeutic recommendations for any noted side effects of their dietary approach based upon [facility] patient manual per providers recommendation.

## 2021-08-09 NOTE — LETTER
NOTIFICATION RETURN TO WORK / SCHOOL    8/9/2021 8:15 AM    Ms. Katherine Medina  [address] [place] [id number]      To Whom It May Concern:    Katherine Medina is currently under the care of Chester Jackson. She will return to work/school on: 08/09/2021    If there are questions or concerns please have the patient contact our office.         Sincerely,      Mauricio Roe M.D.

## 2021-08-10 ENCOUNTER — OFFICE VISIT (OUTPATIENT)
Dept: SURGERY | Age: 56
End: 2021-08-10

## 2021-08-10 DIAGNOSIS — E66.01 MORBID OBESITY (HCC): Primary | ICD-10-CM

## 2021-08-16 NOTE — PROGRESS NOTES
[facility]  Metabolic Weight Loss Program        Patient's Name: Bear Cordoba  : 1965    This patient is enrolled in [address] [place] [address] Weight Loss Program and attended the required weekly virtual nutrition class hosted via [employer] today.       Saleem James RD

## 2021-08-18 ENCOUNTER — OFFICE VISIT (OUTPATIENT)
Dept: SURGERY | Age: 56
End: 2021-08-18

## 2021-08-18 DIAGNOSIS — E66.01 CLASS 3 SEVERE OBESITY DUE TO EXCESS CALORIES WITH SERIOUS COMORBIDITY AND BODY MASS INDEX (BMI) OF 40.0 TO 44.9 IN ADULT (HCC): Primary | ICD-10-CM

## 2021-08-18 PROBLEM — M25.561 CHRONIC PAIN OF BOTH KNEES: Status: ACTIVE | Noted: 2021-08-18

## 2021-08-18 PROBLEM — I10 ESSENTIAL HYPERTENSION: Status: ACTIVE | Noted: 2021-08-18

## 2021-08-18 PROBLEM — G47.62 NOCTURNAL LEG CRAMPS: Status: ACTIVE | Noted: 2021-08-18

## 2021-08-18 PROBLEM — E89.0 POSTOPERATIVE HYPOTHYROIDISM: Status: ACTIVE | Noted: 2021-08-18

## 2021-08-18 PROBLEM — E88.81 INSULIN RESISTANCE: Status: ACTIVE | Noted: 2021-08-18

## 2021-08-18 PROBLEM — R53.82 CHRONIC FATIGUE: Status: ACTIVE | Noted: 2021-08-18

## 2021-08-18 PROBLEM — M25.562 CHRONIC PAIN OF BOTH KNEES: Status: ACTIVE | Noted: 2021-08-18

## 2021-08-18 PROBLEM — R63.4 WEIGHT LOSS: Status: ACTIVE | Noted: 2021-08-18

## 2021-08-18 PROBLEM — G89.29 CHRONIC PAIN OF BOTH KNEES: Status: ACTIVE | Noted: 2021-08-18

## 2021-08-23 ENCOUNTER — CLINICAL SUPPORT (OUTPATIENT)
Dept: SURGERY | Age: 56
End: 2021-08-23

## 2021-08-23 VITALS
RESPIRATION RATE: 18 BRPM | BODY MASS INDEX: 42.85 KG/M2 | WEIGHT: 251 LBS | HEIGHT: 64 IN | OXYGEN SATURATION: 98 % | HEART RATE: 100 BPM | DIASTOLIC BLOOD PRESSURE: 96 MMHG | SYSTOLIC BLOOD PRESSURE: 158 MMHG

## 2021-08-23 DIAGNOSIS — E66.01 CLASS 3 SEVERE OBESITY DUE TO EXCESS CALORIES WITH SERIOUS COMORBIDITY AND BODY MASS INDEX (BMI) OF 40.0 TO 44.9 IN ADULT (HCC): Primary | ICD-10-CM

## 2021-08-23 NOTE — PROGRESS NOTES
Progress Note: Weekly Education Class in the [facility]         Patient is on Very Low Calorie Diet [] (4 meal replacements per day, 800 kcal/day)      Low Calorie Diet [x] (2-3 meal replacements per day, 4950-0397 kcal/day)    1) Did patient have any new symptoms or physical problems? Yes []    No [x]    If yes, check & comment: weakness [], fatigue [], lightheadedness [], headache [], cramps [], cold intolerance [], hair loss [], diarrhea [], constipation [],  NA [] other:                                 2) Has patient had any medical attention from other providers, urgent care or the emergency room this week? Yes []  No [x]       NA [], If yes, why:                                      3) Any other sugar sweetened beverages consumed this week? Yes []  No [x]    4) Did patient have any problems adhering to the diet? Yes []  No [x] NA []    If yes, Vacation [], Celebrations [], Conferences [], Family Reunions [] other:                                                5) How many hours of sleep this week?     (range)  NA [x]    Number of meal replacements consumed daily? 3 (range)  NA []    Average ounces of water patient consumed daily this week (not including shakes)? 64  (divide the weekly total by 7)    Did you eat any food outside of the program? Yes [x] No []    Physical Activity Over the Past Week:    Cardio exercise: 0 min  Strength exercise: 0 workouts / week  Number of steps walked per day: 3000-66553    How has patient mood overall been this week? Sad [], Happy [], Stressed [], Tired [], Content [x], NA [], other            Medications reconciled by nurse Yes [x]  No[]    Patient was given therapeutic recommendations for any noted side effects of their dietary approach based upon [facility] patient manual per providers recommendation.

## 2021-08-23 NOTE — PROGRESS NOTES
Progress Note: Weekly Education Class in the [facility]         Patient is on Very Low Calorie Diet [] (4 meal replacements per day, 800 kcal/day)      Low Calorie Diet [x] (2-3 meal replacements per day, 7223-2452 kcal/day)    1) Did patient have any new symptoms or physical problems? Yes []    No [x]    If yes, check & comment: weakness [], fatigue [], lightheadedness [], headache [], cramps [], cold intolerance [], hair loss [], diarrhea [], constipation [],  NA [] other:                                 2) Has patient had any medical attention from other providers, urgent care or the emergency room this week? Yes []  No [x]       NA [], If yes, why:                                      3) Any other sugar sweetened beverages consumed this week? Yes [x]  No []    4) Did patient have any problems adhering to the diet? Yes [x]  No [] NA []    If yes, Vacation [], Celebrations [], Conferences [], Family Reunions [] other: Work schedule                                               5) How many hours of sleep this week?     (range)  NA [x]    Number of meal replacements consumed daily? 3 (range)  NA []    Average ounces of water patient consumed daily this week (not including shakes)? 64    (divide the weekly total by 7)    Did you eat any food outside of the program? Yes [] No [x]    Physical Activity Over the Past Week:    Cardio exercise: 0 min  Strength exercise: 0 workouts / week  Number of steps walked per day: 2000-78143    How has patient mood overall been this week? Sad [], Happy [], Stressed [], Tired [], Content [x], NA [], other            Medications reconciled by nurse Yes [x]  No[]    Patient was given therapeutic recommendations for any noted side effects of their dietary approach based upon [facility] patient manual per providers recommendation.

## 2021-08-26 ENCOUNTER — OFFICE VISIT (OUTPATIENT)
Dept: SURGERY | Age: 56
End: 2021-08-26

## 2021-08-26 DIAGNOSIS — E66.01 CLASS 3 SEVERE OBESITY DUE TO EXCESS CALORIES WITH SERIOUS COMORBIDITY AND BODY MASS INDEX (BMI) OF 40.0 TO 44.9 IN ADULT (HCC): Primary | ICD-10-CM

## 2021-08-31 ENCOUNTER — CLINICAL SUPPORT (OUTPATIENT)
Dept: SURGERY | Age: 56
End: 2021-08-31

## 2021-08-31 DIAGNOSIS — E66.01 MORBID OBESITY (HCC): Primary | ICD-10-CM

## 2021-08-31 NOTE — PROGRESS NOTES
[facility]  Metabolic Weight Loss Program        Patient's Name: Baron Westfall  : 1965    This patient is enrolled in [address] [place] [address] Weight Loss Program and attended the required weekly virtual nutrition class hosted via [address] [place] [address] today.       Mannie Kent, MS, RD, LDN

## 2021-09-01 NOTE — PROGRESS NOTES
[facility]  Metabolic Weight Loss Program        Patient's Name: Arnaldo Urbina  : 1965    This patient is enrolled in [address] [place] [address] Weight Loss Program and attended the required weekly virtual nutrition class hosted via [employer].       Linnea Burnett MS, RD, LDN

## 2021-09-01 NOTE — PROGRESS NOTES
Tamiko Flores presents for weekly or bi-monthly evaluation of Obesity Body mass index is 42.91 kg/m². Visit Vitals  BP (!) 162/98 (BP 1 Location: Right arm)   Pulse 72   Wt 250 lb (113.4 kg)   SpO2 98%   BMI 42.91 kg/m²       Wt Readings from Last 3 Encounters:   08/23/21 251 lb (113.9 kg)   08/09/21 250 lb (113.4 kg)   07/26/21 253 lb 4.8 oz (114.9 kg)       1. Class 3 severe obesity due to excess calories with serious comorbidity and body mass index (BMI) of 40.0 to 44.9 in adult [facility])         I have reviewed and agree with nurse documentation of Weekly Education Class nurse progress note for [facility] Weight [address] [place] [address] [place]). Tamiko Flores is compliant with program requirements and may continue participation utilizing the [employer] meal replacements, as discussed. Patient has been advised to refer to the [facility] Patient Manual and notify us if any side effects to this meal plan are present and/or persist.  Tamiko Flores may continue the current dietary approach, care and follow up at the monthly office visit with the provider, as scheduled. Follow-up and Dispositions    · Return in about 2 weeks (around 8/23/2021) for weight check. Documentation true and accepted by Aneta Perez.  Suraj Whitehead., AOBFP, Diplomate KECIA MAYER

## 2021-09-01 NOTE — PROGRESS NOTES
Nina Donnelly presents for weekly or bi-monthly evaluation of Obesity Body mass index is 43.08 kg/m². Visit Vitals  BP (!) 158/96 (BP 1 Location: Right arm, BP Patient Position: Sitting)   Pulse 100   Resp 18   Ht 5' 4\" (1.626 m)   Wt 251 lb (113.9 kg)   SpO2 98%   BMI 43.08 kg/m²       Wt Readings from Last 3 Encounters:   08/23/21 251 lb (113.9 kg)   08/09/21 250 lb (113.4 kg)   07/26/21 253 lb 4.8 oz (114.9 kg)       1. Class 3 severe obesity due to excess calories with serious comorbidity and body mass index (BMI) of 40.0 to 44.9 in adult [facility])         I have reviewed and agree with nurse documentation of Weekly Education Class nurse progress note for [facility] [employer] [facility] Weight [address] [place] [address] [place]). Nina Donnelly is compliant with program requirements and may continue participation utilizing the [employer] Direction meal replacements, as discussed. Patient has been advised to refer to the [facility] Patient Manual and notify us if any side effects to this meal plan are present and/or persist.  Nina Donnelly may continue the current dietary approach, care and follow up at the monthly office visit with the provider, as scheduled. Check BP twice weekly. Notify pcp if it consistently is above 140/90 or below 90/60. Take your blood pressure log to your next office visit. Decrease salt, fats, caffeine, alcohol in your diet. Increase water, fiber. Exercise 5 times weekly for 30 minutes daily as tolerated. Continue current medications, care and subspecialty follow up. Visit eye doctor yearly to check your eyes blood pressure related changes. Follow-up and Dispositions    · Return in about 2 weeks (around 9/6/2021) for weight, ND LCD. Documentation true and accepted by Sacha Talbot.  Nikki Rizo., AOMANUELP, Diplomate KECIA MAYER

## 2021-09-02 ENCOUNTER — OFFICE VISIT (OUTPATIENT)
Dept: SURGERY | Age: 56
End: 2021-09-02

## 2021-09-02 DIAGNOSIS — E66.01 CLASS 3 SEVERE OBESITY DUE TO EXCESS CALORIES WITH SERIOUS COMORBIDITY AND BODY MASS INDEX (BMI) OF 40.0 TO 44.9 IN ADULT (HCC): Primary | ICD-10-CM

## 2021-09-03 NOTE — PROGRESS NOTES
[facility]  Metabolic Program Follow-up Nutrition Consult    Date: 2021   Physician: Dr. Dav Mane  Name: Michael Canseco  :  1965    Type of Plan: LCD  Weeks on Plan: 4  Virtual visit was completed through [address] [place] [address]. ASSESSMENT:    Medications/Supplements:   Prior to Admission medications    Medication Sig Start Date End Date Taking? Authorizing Provider   ergocalciferol (ERGOCALCIFEROL) 1,250 mcg (50,000 unit) capsule Take 1 Capsule by mouth every seven (7) days. Indications: low vitamin D levels 21   Reinaldo Pila R, DO   levothyroxine (SYNTHROID) 175 mcg tablet Take 175 mcg by mouth daily. Provider, Historical   diclofenac (VOLTAREN) 1 % gel Apply  to affected area three (3) times daily as needed. 21   Provider, Historical   atorvastatin (LIPITOR) 20 mg tablet Take 20 mg by mouth daily. Indications: high cholesterol    Provider, Historical   furosemide (LASIX) 40 mg tablet Take 60 mg by mouth daily. For leg swelling    Provider, Historical   estradioL (ESTRACE) 0.5 mg tablet Take 0.5 mg by mouth daily. Provider, Historical   meloxicam (MOBIC) 15 mg tablet Take 15 mg by mouth daily. Provider, Historical   losartan (COZAAR) 50 mg tablet Take 50 mg by mouth daily. Provider, Historical              Starting Weight: 252 lbs   Current Weight: 245 lbs  Overall Pounds Lost: 7 lbs Overall Pounds Gained: 0  Long term wt goal: 170 lbs    Exercise/Physical Activity:PT- bike 10 min at therapy; awaiting knee surgery after weight loss achieved. Can walk for short distances or ride stationary bike. Is patient using [employer] products: Yes  If yes, how many per day: 3 (2 shakes, 1 bar)    Aversions/side effects of product/program: none    Fluids used to mix with products: water    Reported Diet History: Drinking 2 ND protein shakes a day; lunch includes salad w/protein;  ND bar and 8 ritz crackers for snack. Recently stopped drinking soda.  Not reaching fluid goal of 64 oz per day (drinking ~48 oz/day)    24 Hour Diet Recall  Breakfast On way to work-730  ND protein shake w/ice   Lunch 1-2pm Salad w/protein-grilled chix, lite vinegarette dressing   snack 5pm ND bar   Dinner 8pm-on way home ND shake   Snacks  Ritz crackers- 8   Beverages  Water- 48 oz     Barriers/concerns preventing patient from achieving goal(s) since last visit: not drinking enough water    NUTRITION INTERVENTION:  Pt educated on nutrition recommendations for the [employer] Low Calorie Plan (LCD), with the specific meal pattern of 2 ND protein shakes, 1 ND bar (snack); 64 oz fluid/day    Grocery meal:  Use the balanced plate method to plan meals, include 3-6 oz of lean source of protein, unlimited non-starchy vegetables, 1/2 cup whole grains/beans OR 1/2 cup fruit OR 1 serving of low fat dairy. Utilize handouts listing healthy snack and meal ideas. Read all nutrition labels. Demonstrated and emphasized identifying serving size, total fat, sugar and protein content. Defined low fat as </= 3 g per serving. Discussed lean and extra lean sources of protein. Avoid foods with sugar listed in the first 3 ingredients and >/10 g sugar per serving. Consume meal replacements every three to four hours or pattern as discussed with provider. Mix with water. May add herbs/spices for taste. Practice mindful eating habits; take small bites, chew thoroughly, avoid distractions, utilize hunger/fullness scale. Reviewed attendance policy of attending weekly nutrition classes. Metabolic support group recommended, and increase physical activity (approved per MD) for long term weight maintenance. NUTRITION MONITORING AND EVALUATION:    The following goals were established with patient;    1. Increase water to 64 oz per day  2. Continue with 2 ND protein shakes, 1 ND bar per day  3.  Increase exercise as tolerated    Specific tips and techniques to facilitate compliance with above recommendations were provided and discussed. If further details are desired please contact me at [phone]. This phone number was also provided to the patient for any further questions or concerns.           Meagan Royal RD

## 2021-09-09 ENCOUNTER — OFFICE VISIT (OUTPATIENT)
Dept: SURGERY | Age: 56
End: 2021-09-09

## 2021-09-09 DIAGNOSIS — E66.01 CLASS 3 SEVERE OBESITY DUE TO EXCESS CALORIES WITH SERIOUS COMORBIDITY AND BODY MASS INDEX (BMI) OF 40.0 TO 44.9 IN ADULT (HCC): Primary | ICD-10-CM

## 2021-09-10 ENCOUNTER — CLINICAL SUPPORT (OUTPATIENT)
Dept: SURGERY | Age: 56
End: 2021-09-10

## 2021-09-10 VITALS
BODY MASS INDEX: 42.76 KG/M2 | RESPIRATION RATE: 18 BRPM | OXYGEN SATURATION: 99 % | WEIGHT: 250.5 LBS | HEART RATE: 86 BPM | DIASTOLIC BLOOD PRESSURE: 94 MMHG | HEIGHT: 64 IN | SYSTOLIC BLOOD PRESSURE: 142 MMHG

## 2021-09-10 DIAGNOSIS — E66.01 CLASS 3 SEVERE OBESITY DUE TO EXCESS CALORIES WITH SERIOUS COMORBIDITY AND BODY MASS INDEX (BMI) OF 40.0 TO 44.9 IN ADULT (HCC): Primary | ICD-10-CM

## 2021-09-10 NOTE — PROGRESS NOTES
Progress Note: Weekly Education Class in the [facility]         Patient is on Very Low Calorie Diet [] (4 meal replacements per day, 800 kcal/day)      Low Calorie Diet [x] (2-3 meal replacements per day, 1140-1238 kcal/day)    1) Did patient have any new symptoms or physical problems? Yes []    No [x]    If yes, check & comment: weakness [], fatigue [], lightheadedness [], headache [], cramps [], cold intolerance [], hair loss [], diarrhea [], constipation [],  NA [] other:                                 2) Has patient had any medical attention from other providers, urgent care or the emergency room this week? Yes []  No [x]       NA [], If yes, why:                                      3) Any other sugar sweetened beverages consumed this week? Yes []  No [x]    4) Did patient have any problems adhering to the diet? Yes []  No [x] NA []    If yes, Vacation [], Celebrations [], Conferences [], Family Reunions [] other:                                                5) How many hours of sleep this week?     (range)  NA [x]    Number of meal replacements consumed daily? 3 (range)  NA []    Average ounces of water patient consumed daily this week (not including shakes)? 52   (divide the weekly total by 7)    Did you eat any food outside of the program? Yes [] No [x]    Physical Activity Over the Past Week:    Cardio exercise: 0 min  Strength exercise: 0 workouts / week  Number of steps walked per day: [id number]    How has patient mood overall been this week? Sad [], Happy [], Stressed [], Tired [x], Content [x], NA [], other            Medications reconciled by nurse Yes [x]  No[]    Patient was given therapeutic recommendations for any noted side effects of their dietary approach based upon [facility] patient manual per providers recommendation.

## 2021-09-10 NOTE — PROGRESS NOTES
Progress Note: Weekly Education Class in the [facility]         Patient is on Very Low Calorie Diet [] (4 meal replacements per day, 800 kcal/day)      Low Calorie Diet [x] (2-3 meal replacements per day, 8105-9019 kcal/day)    1) Did patient have any new symptoms or physical problems? Yes []    No [x]    If yes, check & comment: weakness [], fatigue [], lightheadedness [], headache [], cramps [], cold intolerance [], hair loss [], diarrhea [], constipation [],  NA [] other:                                 2) Has patient had any medical attention from other providers, urgent care or the emergency room this week? Yes []  No [x]       NA [], If yes, why:                                    3) Any other sugar sweetened beverages consumed this week? Yes []  No [x]    4) Did patient have any problems adhering to the diet? Yes []  No [x] NA []    If yes, Vacation [], Celebrations [], Conferences [], Family Reunions [] other:                                               5) How many hours of sleep this week?     (range)  NA [x]    Number of meal replacements consumed daily? 3 (range)  NA []    Average ounces of water patient consumed daily this week (not including shakes)? 48   (divide the weekly total by 7)    Did you eat any food outside of the program? Yes [x] No []    Physical Activity Over the Past Week:    Cardio exercise: 0 min  Strength exercise: 0 workouts / week  Number of steps walked per day: [phone]    How has patient mood overall been this week? Sad [], Happy [], Stressed [x], Tired [x], Content [], NA [], other            Medications reconciled by nurse Yes [x]  No[]    Patient was given therapeutic recommendations for any noted side effects of their dietary approach based upon [facility] patient manual per providers recommendation.

## 2021-09-10 NOTE — PROGRESS NOTES
Progress Note: Weekly Education Class in the [facility]         Patient is on Very Low Calorie Diet [] (4 meal replacements per day, 800 kcal/day)      Low Calorie Diet [x] (2-3 meal replacements per day, 6214-2676 kcal/day)    1) Did patient have any new symptoms or physical problems? Yes [x]    No []    If yes, check & comment: weakness [], fatigue [x], lightheadedness [], headache [], cramps [x], cold intolerance [], hair loss [], diarrhea [], constipation [],  NA [] other:                                 2) Has patient had any medical attention from other providers, urgent care or the emergency room this week? Yes []  No [x]       NA [], If yes, why:                                      3) Any other sugar sweetened beverages consumed this week? Yes [x]  No [x]    4) Did patient have any problems adhering to the diet? Yes [x]  No [x] NA []    If yes, Vacation [], Celebrations [], Conferences [], Family Reunions [] other: Sons 21st birthday but bounced right back. 5) How many hours of sleep this week?     (range)  NA [x]    Number of meal replacements consumed daily? 2-3 (range)  NA []    Average ounces of water patient consumed daily this week (not including shakes)? 43    (divide the weekly total by 7)    Did you eat any food outside of the program? Yes [] No [x]    Physical Activity Over the Past Week:    Cardio exercise: 0 min  Strength exercise:0 workouts / week  Number of steps walked per day: [phone]    How has patient mood overall been this week? Sad [], Happy [], Stressed [], Tired [x], Content [x], NA [], other            Medications reconciled by nurse Yes [x]  No[]    Patient was given therapeutic recommendations for any noted side effects of their dietary approach based upon [facility] patient manual per providers recommendation.

## 2021-09-15 ENCOUNTER — VIRTUAL VISIT (OUTPATIENT)
Dept: SURGERY | Age: 56
End: 2021-09-15
Payer: COMMERCIAL

## 2021-09-15 DIAGNOSIS — G89.29 CHRONIC PAIN OF BOTH KNEES: ICD-10-CM

## 2021-09-15 DIAGNOSIS — R63.4 WEIGHT LOSS: ICD-10-CM

## 2021-09-15 DIAGNOSIS — R53.82 CHRONIC FATIGUE: ICD-10-CM

## 2021-09-15 DIAGNOSIS — E89.0 POSTOPERATIVE HYPOTHYROIDISM: ICD-10-CM

## 2021-09-15 DIAGNOSIS — M25.562 CHRONIC PAIN OF BOTH KNEES: ICD-10-CM

## 2021-09-15 DIAGNOSIS — G47.62 NOCTURNAL LEG CRAMPS: ICD-10-CM

## 2021-09-15 DIAGNOSIS — E55.9 VITAMIN D DEFICIENCY: ICD-10-CM

## 2021-09-15 DIAGNOSIS — E66.01 CLASS 3 SEVERE OBESITY DUE TO EXCESS CALORIES WITH SERIOUS COMORBIDITY AND BODY MASS INDEX (BMI) OF 40.0 TO 44.9 IN ADULT (HCC): Primary | ICD-10-CM

## 2021-09-15 DIAGNOSIS — E78.5 DYSLIPIDEMIA: ICD-10-CM

## 2021-09-15 DIAGNOSIS — Z90.710 S/P TAH (TOTAL ABDOMINAL HYSTERECTOMY): ICD-10-CM

## 2021-09-15 DIAGNOSIS — G47.33 OBSTRUCTIVE SLEEP APNEA SYNDROME: ICD-10-CM

## 2021-09-15 DIAGNOSIS — E89.0 HISTORY OF TOTAL THYROIDECTOMY: ICD-10-CM

## 2021-09-15 DIAGNOSIS — E88.81 INSULIN RESISTANCE: ICD-10-CM

## 2021-09-15 DIAGNOSIS — M25.561 CHRONIC PAIN OF BOTH KNEES: ICD-10-CM

## 2021-09-15 DIAGNOSIS — I10 ESSENTIAL HYPERTENSION: ICD-10-CM

## 2021-09-15 PROCEDURE — 99214 OFFICE O/P EST MOD 30 MIN: CPT | Performed by: FAMILY MEDICINE

## 2021-09-15 NOTE — PROGRESS NOTES
[id number] [address], 200  22.  [phone] o  [facility] VISIT    Patient Name:  Rom Rodriguez, 1965    PCP:  VERONIKA Maier    Rom Rodriguez was evaluated through a synchronous (real-time) audio-video encounter. The patient (or guardian if applicable) is aware that this is a billable service. Verbal consent to proceed has been obtained within the past 12 months. The visit was conducted pursuant to the emergency declaration under the [facility], [address] [place] [address] authority and the [employer] General Act. Patient identification was verified. The patient was located in a state where the provider was credentialed to provide care. Method of Delivery:  Video  Platform:  [email or web address] me  My location:  Home Office                Patient location:  Work    Rom Rodriguez is a 49 y.o. female with Obesity Class 3 There is no height or weight on file to calculate BMI. and associated health concerns was seen by synchronous (real-time) audio-video technology on 9/15/2021 for Weight Management    Patient has been participating in the [employer] Management Program since 07/13/21 using the [employer] [facility] [place] Low Calorie Diet (LCD, 1054-7934 kcal/day.)    What makes it difficult to adhere to this plan of care over the past month:  Finding food to eat as my grocery meal.  I need crunchy foods. Hard to drink enough water. Trying to figure out lunch.   Sometimes I do not leave work until 7:30 -8 pm.    Patient goals for participation in this weight management program:   175 lbs and to have BL TKR and get back to myself    Anthropometric Measures Previously    Start Weight:   250 lbs 0 oz   BMI:   42.91 kg/m2   Neck circumference:  15 in   Waist circumference:  42 in  Body Fat Percentage:  46.6 %     Weight at time of last appointment on 21:   247 lbs 0 oz        Anthropometric Measures Self-Reported Today  Today's Weight:   247 lbs 0 oz, 0 lbs loss since last appointment in our Center. Weight Metrics 9/10/2021 2021 2021 2021 2021 2021   Weight 250 lb 8 oz 251 lb 250 lb 253 lb 4.8 oz - 250 lb   Neck Circ (inches) - - - - 15 -   Waist Measure Inches - - - - 42 -   Body Fat % - - - - 46.6 -   BMI 43 kg/m2 43.08 kg/m2 42.91 kg/m2 43.48 kg/m2 - 42.91 kg/m2       Total pounds loss since starting this therapeutic approach: 3 lbs   Contributing factors to weight change:  Drinking protein shakes instead of fast food or skipping breakfast    Is patient satisfied with his/her progress since the last appointment:  yes. SURGICAL HISTORY  Previous Weight Loss Surgery:  0     Past Surgical History:   Procedure Laterality Date    HX BUNIONECTOMY      HX  SECTION      HX COLONOSCOPY  2018    [facility]. w colon polypectomy. Due q 5 yrs.  HX HYSTERECTOMY  2017    HX THYROIDECTOMY  2006    due to benign nodules. EATING HABITS  Total calories consumed per day:  1800 kcal      Number of Meal replacements consumed per day:  1800 [employer] food products  Negative Side Effects: Increased urination and bowel movements   Are appetite, hunger and cravings controlled:  somewhat    Typical Meals:        Breakfast: 7:30a ND shake while driving to work       Lunch:  12:30-2:30p salad w grilled chicken or skip to sleep       Dinner: 6-6:30p ND shake or bar      Snacks: Linden horton 8 derek    Barriers to eating meals:   If I am too busy at work or get home too late to eat a meal    Does patient want to continue use of [employer] meal replacements:  yes    DRINKING HABITS  Average amount of water consumed daily: 84.5 oz/day  (Goal 2 L or 67 oz daily, minimally)  Amount of caffeine consumed daily: 0 oz Pepsi but I want it so badly   Sugar-sweetened beverages consumed daily (including alcohol, sodas, teas, juices, etc.): 0  Barriers to drinking minimum 2L water/day:  timing   Social History     Tobacco Use    Smoking status: Passive Smoke Exposure - Never Smoker    Smokeless tobacco: Never Used    Tobacco comment: lived with smoker mom x years til 2009   Vaping Use    Vaping Use: Never used   Substance Use Topics    Alcohol use: Yes     Alcohol/week: 0.0 standard drinks     Comment: Socially    Drug use: Never         SLEEP HABITS  Total hours of sleep nightly: 6 hours (Goal 7-9 hours/nightly)  Rx or OTC Sleep Aids:  0  Occupation:   [employer]   Work hours: 8a-6p or 9p  Personal or family history of Sleep Apnea:  yes CPAP use:  yes  Sleep Specialist:  Not since 2017  Barriers to getting proper rest:  The phone - playing on the phone     PHYSICAL ACTIVITY HISTORY  Type of physical activity:  Walking back and forth at work at [employer], working short handed. Weekend shopping. Enjoyment with physical activity:  yes  Pedometer or fitness tracker/Smartwatch/ Iphone use: yes    Average number steps per day:  10,000 steps/day    Barriers limiting physical activity:  Too tired by the time I get home from work. Mobile Apps used for meal planning, calorie counting, water consumption, sleep, or physical activity:  Water antony and Fitness reminder    WEIGHT LOSS MEDICATION HISTORY  Current weight loss medication:  0   Any weight loss medication prescribed by our office:  0    Outpatient Medications Marked as Taking for the 9/15/21 encounter (Virtual Visit) with Ezzie Kussmaul, DO   Medication Sig Dispense Refill    ergocalciferol (ERGOCALCIFEROL) 1,250 mcg (50,000 unit) capsule Take 1 Capsule by mouth every seven (7) days. Indications: low vitamin D levels 15 Capsule 1    levothyroxine (SYNTHROID) 175 mcg tablet Take 175 mcg by mouth daily.  diclofenac (VOLTAREN) 1 % gel Apply  to affected area three (3) times daily as needed.       atorvastatin (LIPITOR) 20 mg tablet Take 20 mg by mouth daily. Indications: high cholesterol      losartan (COZAAR) 50 mg tablet Take 50 mg by mouth daily. Medications that cause weight gain:  Losartan  Medications that cause weight loss:  0  Changes to medications since last office visit with me:  0    Allergies   Allergen Reactions    Ciprofloxacin Other (comments)     Pain     Doxycycline Other (comments)     Stomach pain    Erythromycin Other (comments)     Stomach pain    Esgic [Butalbital-Acetaminophen-Caff] Other (comments)     Stomach pain       BEHAVIORAL HEALTH HISTORY  DEPRESSION SCREENING:    3 most recent PHQ Screens 7/26/2021   Little interest or pleasure in doing things Not at all   Feeling down, depressed, irritable, or hopeless -   Total Score PHQ 2 -       Any history of mental health conditions (including Depression, Anxiety, Anorexia, Bulimia or Binge Eating disorder): 0  Treating provider and medication(s):0  Is the mental health condition controlled: yes    Any current major lifestyle changes or stressors:   Job stress - short handed, long hours     OTHER MEDICAL CARE SINCE LAST OFFICE VISIT  None. Taking Magnesium in morning and drinking more water helped leg cramps but I felt more tired. Having 3-5 bowel movements daily. No diarrhea, blood in stool, fever, chills or other associated symptoms. ASSOCIATED MEDICAL CONDITIONS  Past Medical History:   Diagnosis Date    Anemia 2016    due to fibroids.     Asthma     Degenerative arthritis of knee, bilateral     Dr. Shania Prabhakar High cholesterol     Hypertension 2014    Left knee injury 2016    Dr. Freddie Denise    Leg swelling     Bilaterally    Sleep apnea     on cpap    Uterine fibroid     Vitamin D deficiency        Family History   Problem Relation Age of Onset    Heart Disease Mother         CHF    Hypertension Mother     Diabetes Father     Heart Disease Father     Hypertension Father     Gout Father     Heart Attack Father 80  Diabetes Paternal Grandfather     Obesity Paternal Grandmother     Heart Disease Sister         Heart palpatations ER visit 2018    No Known Problems Maternal Grandmother     No Known Problems Maternal Grandfather          OB/GYN HISTORY (For Female Patients)  Social History     Substance and Sexual Activity   Sexual Activity Yes    Partners: Male    Birth control/protection: Surgical    Comment: NEENA     OB History        1    Para        Term                AB        Living   1       SAB        TAB        Ectopic        Molar        Multiple        Live Births   1               Menopause:  Surgical  LMP:  NEENA  Are you pregnant or planning on becoming pregnant within 6 months:  no    Do you have upcoming travel in the next 6 weeks:  no.   Patient will also notify the dietician for recommendations during travel. Immunization History   Administered Date(s) Administered    COVID-19, PFIZER, MRNA, LNP-S, PF, 30MCG/0.3ML DOSE 2021, 2021       Health Maintenance   A1c:  SEE RESULTS BELOW     Lipids:  SEE RESULTS BELOW  Depression Screening:  Performed during Initial Visit to Levine, Susan. \Hospital Has a New Name and Outlook.\""  Colonoscopy:  2018, for patients over 49 yo  Mammogram:  Due now, for female patients over 37 yo  Annual Wellness Exam:  To be performed by PCP, when appropriate. ROS    Pt denies hunger, cravings, lack of focus, fatigue, feeling weak, headaches, dizziness, light headedness, nausea, vomiting, diarrhea, constipation, indigestion, rapid heart rate, SOB, low blood sugar, feeling cold, hair loss, rash, fluid retention, leg aches, difficulty sleeping, irritability, mood swings, or other associated symptoms. Review of Systems negative except as noted above in HPI.     PHYSICAL EXAMINATION  (Limited due to being a telehealth encounter)  Self-reported Vital Signs:  Patient-Reported Vitals 9/15/2021   Patient-Reported Weight 247   Patient-Reported Height -   Patient-Reported Pulse 79   Patient-Reported Temperature -   Patient-Reported Systolic  532   Patient-Reported Diastolic 91          Weight Metrics 9/10/2021 8/23/2021 8/9/2021 7/26/2021 7/13/2021 7/13/2021   Weight 250 lb 8 oz 251 lb 250 lb 253 lb 4.8 oz - 250 lb   Neck Circ (inches) - - - - 15 -   Waist Measure Inches - - - - 42 -   Body Fat % - - - - 46.6 -   BMI 43 kg/m2 43.08 kg/m2 42.91 kg/m2 43.48 kg/m2 - 42.91 kg/m2          Neck Circumference:  Acceptable range for M >16 inches, F>15 inches  Waist Circumference:  Acceptable range for M> 40 inches/102 cm, F > 35 inches, 88 cm  Body Fat: Acceptable range for M 18-24%, F 25-31%    General appearance - Well nourished. Well appearing. Well developed. No acute distress. Obese. Head - Normocephalic. Atraumatic. Eyes - Extraocular eye movements intact. Sclera anicteric. Ears - Hearing is grossly normal bilaterally. Nose - normal and patent. No discharge. No nasal flaring noted. Mouth - oral mucous membranes with adequate moisture. Neck -   Midline trachea. No neck masses noted. No neck retractions noted. Chest - Unlabored respirations. Symmetrical chest.  No conversational dyspnea noted. Heart - normal rate. Neurological - awake, alert and oriented to person, place, and time and event. Cranial nerves II through XII intact. Clear speech. Muscle strength is +5/5 x 4 extremities. Musculoskeletal - Intact x 4 extremities. No pain with movement. Psychological -   normal behavior, dress and thought processes. Good insight. Good eye contact. Normal affect. Appropriate mood. Normal speech.     DATA REVIEWED    Lab Results   Component Value Date/Time    WBC 7.9 07/24/2021 09:56 AM    HGB 13.1 07/24/2021 09:56 AM    HCT 39.6 07/24/2021 09:56 AM    MCV 82 07/24/2021 09:56 AM     Lab Results   Component Value Date/Time    Sodium 140 07/24/2021 09:56 AM    Potassium 4.5 07/24/2021 09:56 AM    Chloride 103 07/24/2021 09:56 AM    CO2 25 07/24/2021 09:56 AM    Glucose 89 07/24/2021 09:56 AM    BUN 11 07/24/2021 09:56 AM    Creatinine 0.89 07/24/2021 09:56 AM    BUN/Creatinine ratio 12 07/24/2021 09:56 AM    GFR est AA 85 07/24/2021 09:56 AM    GFR est non-AA 74 07/24/2021 09:56 AM    Calcium 9.7 07/24/2021 09:56 AM    Bilirubin, total 0.8 07/24/2021 09:56 AM    Alk.  phosphatase 99 07/24/2021 09:56 AM    Protein, total 6.9 07/24/2021 09:56 AM    Albumin 4.4 07/24/2021 09:56 AM    A-G Ratio 1.8 07/24/2021 09:56 AM    ALT (SGPT) 31 07/24/2021 09:56 AM    AST (SGOT) 25 07/24/2021 09:56 AM     Lab Results   Component Value Date/Time    Hemoglobin A1c 5.4 07/24/2021 09:56 AM     Lab Results   Component Value Date/Time    TSH 0.033 (L) 07/24/2021 09:56 AM     Lab Results   Component Value Date/Time    Uric acid 4.2 07/24/2021 09:56 AM     Magnesium   Date Value Ref Range Status   07/24/2021 2.0 1.6 - 2.3 mg/dL Final     Lab Results   Component Value Date/Time    Vitamin B12 304 07/24/2021 09:56 AM    Folate 6.4 07/24/2021 09:56 AM     Lab Results   Component Value Date/Time    VITAMIN D, 25-HYDROXY 22.6 (L) 07/24/2021 09:56 AM     Lab Results   Component Value Date/Time    Iron 57 07/24/2021 09:56 AM     Lab Results   Component Value Date/Time    Cholesterol, total 218 (H) 07/24/2021 09:56 AM    Cholesterol, Total 229 (H) 07/24/2021 09:56 AM    HDL Cholesterol 46 07/24/2021 09:56 AM    LDL, calculated 153 (H) 07/24/2021 09:56 AM    VLDL, calculated 19 07/24/2021 09:56 AM    Triglyceride 103 07/24/2021 09:56 AM   )  Results for orders placed or performed in visit on 07/13/21   NMR LIPOPROFILE WITH LIPIDS (WITHOUT GRAPH)     Status: Abnormal   Result Value Ref Range Status    LDL-P 2,240 (H) <1,000 nmol/L Final     Comment:                           Low                   < 1000                            Moderate         1000 - 1299                            Borderline-High  1300 - 1599                            High             1600 - 2000                            Very High             > 2000 LDL-C(NIH CALC) 162 (H) 0 - 99 mg/dL Final     Comment:                           Optimal               <  100                            Above optimal     100 -  129                            Borderline        130 -  159                            High              160 -  189                            Very high             >  189      HDL-C 49 >39 mg/dL Final    Triglycerides 103 0 - 149 mg/dL Final    Cholesterol, Total 229 (H) 100 - 199 mg/dL Final    HDL-P (Total) 32.9 >=30.5 umol/L Final    Small LDL-P 1,327 (H) <=527 nmol/L Final    LDL size 20.6 >20.5 nm Final     Comment:  ----------------------------------------------------------                   ** INTERPRETATIVE INFORMATION**                   PARTICLE CONCENTRATION AND SIZE                      <--Lower CVD Risk   Higher CVD Risk-->    LDL AND HDL PARTICLES   Percentile in Reference Population    HDL-P (total)        High     75th    50th    25th   Low                         >34.9    34.9    30.5    26.7   <26.7    Small LDL-P          Low      25th    50th    75th   High                         <117     117     527     839    >839    LDL Size   <-Large (Pattern A)->    <-Small (Pattern B)->                      23.0    20.6           20.5      19.0   ----------------------------------------------------------  Small LDL-P and LDL Size are associated with CVD risk, but not after  LDL-P is taken into account. LP-IR SCORE 59 (H) <=45 Final     Comment: INSULIN RESISTANCE MARKER      <--Insulin Sensitive    Insulin Resistant-->             Percentile in Reference Population  Insulin Resistance Score  LP-IR Score   Low   25th   50th   75th   High                <27   27     45     63     >63  LP-IR Score is inaccurate if patient is non-fasting. The LP-IR score is a laboratory developed index that has been  associated with insulin resistance and diabetes risk and should be  used as one component of a physician's clinical assessment.       Narrative Test(s) 857066-YPH-S; 958906-KRU-I; 123476-Triglycerides; 458664-  Cholesterol, Total; 801168-TBJ-X (Total); 884297-Small LDL-P; 294592-  LDL Size; 741516-KF-ZI Score  was developed and its performance characteristics determined  by [employer]. It has not been cleared or approved by the Food  and Drug Administration. Performed at:  [address]  [id number]  : Matteo Castro MD, Phone:  [phone]        EKG:  Not available    ASSESSMENT     ICD-10-CM ICD-9-CM    1. Class 3 severe obesity due to excess calories with serious comorbidity and body mass index (BMI) of 40.0 to 44.9 in adult ([facility].)  E66.01 278.01     Z68.41 V85.41    2. Chronic fatigue  D14.68 050.61 METABOLIC PANEL, COMPREHENSIVE      MAGNESIUM      TSH 3RD GENERATION    due to vit D vs stress vs indadequate sleep vs Magnesium in the am vs other   3. Dyslipidemia  E78.5 272.4 LIPID PANEL      METABOLIC PANEL, COMPREHENSIVE      NMR LIPOPROFILE WITH LIPIDS (WITHOUT GRAPH)    with elevated LDLP   4. Essential hypertension  I10 401.9 URIC ACID    slightly elevated   5. Obstructive sleep apnea syndrome  G47.33 327.23     stable on CPAP   6. Postoperative hypothyroidism  E89.0 244.0 T4, FREE      TSH 3RD GENERATION   7. Vitamin D deficiency  E55.9 268.9 VITAMIN D, 25 HYDROXY   8. Insulin resistance  E88.81 277.7 INSULIN   9. Chronic pain of both knees  M25.561 719.46     M25.562 338.29     G89.29     10. Nocturnal leg cramps  B51.49 331.50 METABOLIC PANEL, COMPREHENSIVE      MAGNESIUM    improved since drinking more water and taking OTC Magnesium in the morning   11. BMI 40.0-44.9, adult ([facility] 75.)  Z68.41 V85.41    12. Weight loss  R63.4 783.21     3 lbs since starting LCD, due to reduced Pepsi, increased water, no skipping meals and effort   13. S/P NEENA (total abdominal hysterectomy)  Z90.710 V88.01    14.  History of total thyroidectomy  E89.0 246.8        We discussed the expected course, resolution and complications of the diagnosis(es) in detail. WEIGHT MANAGEMENT PLAN  Agree with nurse documentation. Chart was reviewed and updated during the office visit today. Cyndie Hummel has fulfilled [facility] program requirements this month by completing homework forms, attending educational classes, nurse triage visits and monthly provider appointments as agreed and remains in good standing. Reviewed and appreciate registered dietician note for nutritional counseling. Patient has attended all requirements of the program over the past month and is in good standing. Reviewed and appreciate bimonthly nurse visits and agree with documentation. Followed up on any patient concerns today. Emphasized the importance of the patient continuing care from current primary care provider and other specialists while participating in this weight management program.  Patient has full access to all our office notes, orders, lab results on [employer] and can provide them copies, if desired. Advised patient to follow up with pcp for any acute symptoms and Health Maintenance. Continue current medications as directed by prescribers. Identified and discussed medications patient is taking that can cause weight gain or weight loss as recorded on patient's medication list.  Advised patient not to stop any use without discussing with the prescribing provider. Ask prescribing providers to consider more weight neutral or weight negative options. Will monitor patient's weight and health with continued use. Supplement recommendations: Take OTC Magnesium 400 mg po at night every other day instead of daily prn sleep, muscle cramping, constipation due to frequent bowel movements. Take OTC vit B12 1000 mcg daily orally if taking Metformin or experiencing numbness and tingling. Take OTC Fish Oil 1000 mg with EPA and -1,000 mg daily if experiencing dry skin, low HDL.   Use with caution if history of heartburn exists. Increase fiber products (I.e. fiber tea, fiber shakes) or try OTC Fiber products (I.e. Benefiber, Metamucil, psyllium, etc) if experiencing constipation or hunger. Take OTC Lactaid with meal replacements, if lactose intolerance history exists or symptoms begin. Referred patient to [employer] Patient Manual for recommended antidotes, if any new symptoms or side effects have been experienced once dietary approach is initiated. Advised patient to notify our office if this occurs. Reviewed and discussed most recent lab results. If lab results or EKG have already been performed by another provider and are not available today, advised patient to sign a release to provide our program a copy of each. Recheck pertinent labs monthly while participating in LCD and using [employer] meal replacements, as discussed to identify electrolyte abnormalities and guide therapeutic approach. Will repeat EKG for ever 50 lbs of weight loss, per [employer] guidelines. An order form for pertinent labs was given to patient today. Patient was advised to have the labwork performed 1 week prior to the next monthly appointment with me. Labs ordered today will be reviewed in detail at the next office visit and time allowed for any questions regarding the results. Advised patient to sign up for [employer] and view lab results directly. Notify me by [facility] if any questions or concerns arise. Discussed the patient's BMI, anthropometric measures and goals with patient. The weight management follow up plan is as follows:     Dietary Prescription:    Recommended meal plan:  [employer] Low Calorie Dietary approach, 2473-3712 kcal/day, 2-3 meal replacements daily. Make sure proper daily calories are consumed with each meal.  Encouraged patient to stay within the recommended amount of calories per day. Do not skip meals.   Meals must be eaten within a 3-4 hour time period in order to prevent potential side effects, including low blood sugar. Work on the timing of your meals. Strongly emphasized that patient drink a minimum of 2 L (67 oz) of water daily up to half your body weight in ounces of water while utilizing this dietary approach to prevent dehydration and potential side effects. Drink the majority of water prior to supper to prevent nocturnal urination. Avoid sugar-sweetened beverages, including sodas, alcohol, juice. Reduce Pepsi to 4-8 oz/week. Limit caffeine intake to prevent sleep interference, heart palpitations and dehydration from increased urination. Will allow 1 8 oz cup of black coffee or green tea (to stimulate release of Adiponectin and promote fat burning.)    Consume any alcohol or caffeine 6-12 hours before bedtime to prevent sleep disturbances and bladder irritation at night. Referred patient to [employer] Patient Manual for recommended antidotes, if any new symptoms or side effects have been experienced once dietary approach is initiated. Advised patient to notify our office if this occurs. Exercise Prescription:   Emphasized importance of mild physical activity and reducing sedentary time. Advised patient to work with RD to ensure you have proper calories for your level of activity. A goal of 30 minutes physical activity daily is recommended for health benefit and at least 60 minutes daily to prevent weight regain. During weight loss phase, no less than 75% of this time needs to be performing aerobic (i.e. Walking) activity with no more than 25% of the time performing resistance exercises (i.e. Weight lifting, strengthening, toning). During weight maintenance phase, 50% of the physical activity time needs to be spent performing aerobic activity with 50% of the total time performing resistance exercises.       Sleep Prescription:   A goal of 7-9 hours nightly of uninterrupted sleep is recommended to turn off the Grehlin hormone to be released from the stomach and triggers appetite while promoting weight gain. Proper rest turns on Leptin hormone to be released from white adipose tissue and promotes weight loss. Schedule play time so it does not interfere w recommended sleep time. Counseled patient on health topics:  BP, Obesity, Insulin Resistance, LCD dietary approaches, benefits, contraindications, possible side effects to these diets and how to prevent poor outcomes (including staying hydrated, limit physical exercise while transitioning into this program, avoid skipping meals, etc), weight loss goals, sleep hygiene, barriers to losing weight and keeping weight off, strategies to overcome habits or challenges to approve or continue adherence and stressors. Informed patient that weight loss goal of 5-10% in 6-12 months has shown significant improvement in obesity and its related health conditions including DM, heart disease, asthma. A key study published in Arthritis & Rheumatism of Overweight and Obese Adults with OA found that losing 1 pound of weight resulted in 4 pounds of pressure being removed from the knees. Immunizations noted. Covid vaccines recommended, if patient has not had it. Obesity may increase risk for severe illness and death from Covid-19 disease. Offered empathy, encouragement, legitimation, prayers, partnership to patient. Praised patient for successes. Patient was offered a choice/choices in the treatment plan today. Patient expressed understanding with the diagnoses and the plan and agrees with recommendations. Return in about 1 month (around 10/15/2021).      Total time: 30 mins spent in counseling, coordinating care, reviewing and discussing results, reviewing and discussing relevant provider communication, completing relevant documentation, and discussing treatment plans in reference to The primary encounter diagnosis was Class 3 severe obesity due to excess calories with serious comorbidity and body mass index (BMI) of 40.0 to 44.9 in adult [facility]). Diagnoses of Chronic fatigue, Dyslipidemia, Essential hypertension, Obstructive sleep apnea syndrome, Postoperative hypothyroidism, Vitamin D deficiency, Insulin resistance, Chronic pain of both knees, Nocturnal leg cramps, BMI 40.0-44.9, adult ([address] Weight loss, S/P NEENA (total abdominal hysterectomy), and History of total thyroidectomy were also pertinent to this visit. Turner Shreya, PA FOR ALLOWING ME THE PRIVILEGE TO PARTICIPATE IN THE CARE OF OUR MUTUAL PATIENT, Ms. Contreras WITH RESPECT TO WEIGHT MANAGEMENT. Kathy Montilla DO, Diplomate KECIA MAYER    There are no Patient Instructions on file for this visit.

## 2021-09-16 ENCOUNTER — OFFICE VISIT (OUTPATIENT)
Dept: SURGERY | Age: 56
End: 2021-09-16

## 2021-09-16 DIAGNOSIS — E66.01 CLASS 3 SEVERE OBESITY DUE TO EXCESS CALORIES WITH SERIOUS COMORBIDITY AND BODY MASS INDEX (BMI) OF 40.0 TO 44.9 IN ADULT (HCC): Primary | ICD-10-CM

## 2021-09-16 NOTE — PROGRESS NOTES
[facility]  Metabolic Weight Loss Program        Patient's Name: Ari Foote  : 1965    This patient is enrolled in [address] [place] [address] Weight Loss Program and attended the required weekly virtual nutrition class hosted via [employer] today.       Dori Arreola MS, RD, LDN

## 2021-09-20 NOTE — PROGRESS NOTES
[facility]  Metabolic Weight Loss Program        Patient's Name: Crystal Moran  : 1965    This patient is enrolled in [address] [place] [address] Weight Loss Program and attended the required weekly virtual nutrition class hosted via [employer] today.       Carlos Arroyo, MS, RD, LDN

## 2021-09-24 ENCOUNTER — CLINICAL SUPPORT (OUTPATIENT)
Dept: SURGERY | Age: 56
End: 2021-09-24

## 2021-09-24 VITALS
HEART RATE: 80 BPM | BODY MASS INDEX: 43.26 KG/M2 | DIASTOLIC BLOOD PRESSURE: 88 MMHG | HEIGHT: 64 IN | SYSTOLIC BLOOD PRESSURE: 142 MMHG | OXYGEN SATURATION: 98 % | RESPIRATION RATE: 18 BRPM | WEIGHT: 253.4 LBS

## 2021-09-24 DIAGNOSIS — E66.01 CLASS 3 SEVERE OBESITY DUE TO EXCESS CALORIES WITH SERIOUS COMORBIDITY AND BODY MASS INDEX (BMI) OF 40.0 TO 44.9 IN ADULT (HCC): Primary | ICD-10-CM

## 2021-09-24 NOTE — PROGRESS NOTES
Progress Note: Weekly Education Class in the [facility] Weight Loss Program         Patient is on Very Low Calorie Diet [] (4 meal replacements per day, 800 kcal/day)      Low Calorie Diet [x] (2-3 meal replacements per day, 0974-1284 kcal/day)    1) Did patient have any new symptoms or physical problems? Yes [x]    No []    If yes, check & comment: weakness [], fatigue [], lightheadedness [], headache [], cramps [], cold intolerance [], hair loss [], diarrhea [], constipation [],  NA [] other: Stomach pain but ease once I go to restroom                                2) Has patient had any medical attention from other providers, urgent care or the emergency room this week? Yes []  No [x]       NA [], If yes, why:                                      3) Any other sugar sweetened beverages consumed this week? Yes [x]  No []    4) Did patient have any problems adhering to the diet? Yes [x]  No [] NA []    If yes, Vacation [], Celebrations [], Conferences [], Family Reunions [] other: Work schedule           5) How many hours of sleep this week? 6-7   (range)  NA []    Number of meal replacements consumed daily? 2-3 (range)  NA []    Average ounces of water patient consumed daily this week (not including shakes)? 50     (divide the weekly total by 7)    Did you eat any food outside of the program? Yes [x] No []    Physical Activity Over the Past Week:    Cardio exercise: 0 min  Strength exercise: 0 workouts / week  Number of steps walked per day: 8096-4348    How has patient mood overall been this week? Sad [], Happy [], Stressed [], Tired [], Content [x], NA [], other            Medications reconciled by nurse Yes [x]  No[]    Patient was given therapeutic recommendations for any noted side effects of their dietary approach based upon [facility] patient manual per providers recommendation.

## 2021-09-24 NOTE — PROGRESS NOTES
Progress Note: Weekly Education Class in the [facility] Weight Loss Program         Patient is on Very Low Calorie Diet [] (4 meal replacements per day, 800 kcal/day)      Low Calorie Diet [x] (2-3 meal replacements per day, 3518-3149 kcal/day)    1) Did patient have any new symptoms or physical problems? Yes [x]    No []    If yes, check & comment: weakness [], fatigue [], lightheadedness [], headache [], cramps [], cold intolerance [], hair loss [], diarrhea [], constipation [],  NA [] other: Stomach pain. It comes and goes                                2) Has patient had any medical attention from other providers, urgent care or the emergency room this week? Yes []  No [x]       NA [], If yes, why:           3) Any other sugar sweetened beverages consumed this week? Yes []  No [x]    4) Did patient have any problems adhering to the diet? Yes [x]  No [] NA []    If yes, Vacation [], Celebrations [], Conferences [], Family Reunions [] other: Still trying to tweak my schedule to adhere              5) How many hours of sleep this week? 5-8   (range)  NA []    Number of meal replacements consumed daily? 2-3 (range)  NA []    Average ounces of water patient consumed daily this week (not including shakes)? 54     (divide the weekly total by 7)    Did you eat any food outside of the program? Yes [x] No []    Physical Activity Over the Past Week:    Cardio exercise: 0 min  Strength exercise: 0 workouts / week  Number of steps walked per day: [id number]    How has patient mood overall been this week? Sad [], Happy [], Stressed [], Tired [], Content [], NA [x], other            Medications reconciled by nurse Yes [x]  No[]    Patient was given therapeutic recommendations for any noted side effects of their dietary approach based upon [facility] patient manual per providers recommendation.

## 2021-09-28 NOTE — PROGRESS NOTES
[facility]  Metabolic Weight Loss Program        Patient's Name: Hedy Duran  : 1965    This patient is enrolled in [address] [place] [address] Weight Loss Program and attended the required weekly virtual nutrition class hosted via [address] [place] [address] today.       Jewell Lewis, MS, RD, LDN

## 2021-09-30 ENCOUNTER — OFFICE VISIT (OUTPATIENT)
Dept: SURGERY | Age: 56
End: 2021-09-30

## 2021-09-30 DIAGNOSIS — E66.01 CLASS 3 SEVERE OBESITY DUE TO EXCESS CALORIES WITH SERIOUS COMORBIDITY AND BODY MASS INDEX (BMI) OF 40.0 TO 44.9 IN ADULT (HCC): Primary | ICD-10-CM

## 2021-10-01 NOTE — PROGRESS NOTES
Marilyn Allen presents for weekly or bi-monthly evaluation of Obesity Body mass index is 43 kg/m². Visit Vitals  BP (!) 142/94 (BP 1 Location: Left arm, BP Patient Position: Sitting)   Pulse 86   Resp 18   Ht 5' 4\" (1.626 m)   Wt 250 lb 8 oz (113.6 kg)   SpO2 99%   BMI 43.00 kg/m²       Wt Readings from Last 3 Encounters:   09/24/21 253 lb 6.4 oz (114.9 kg)   09/10/21 250 lb 8 oz (113.6 kg)   08/23/21 251 lb (113.9 kg)       1. Class 3 severe obesity due to excess calories with serious comorbidity and body mass index (BMI) of 40.0 to 44.9 in adult [facility])         I have reviewed and agree with nurse documentation of Weekly Education Class nurse progress note for [facility] [place]). Marilyn Allen is compliant with program requirements and may continue participation utilizing the [employer] meal replacements, as discussed. Patient has been advised to refer to the [facility] Patient Manual and notify us if any side effects to this meal plan are present and/or persist.  Marilyn Allen may continue the current dietary approach, care and follow up at the monthly office visit with the provider, as scheduled. Follow-up and Dispositions    · Return in about 2 weeks (around 9/24/2021) for weight. Documentation true and accepted by Mitch Kemp.  Bryson Preciado., AOBFP, Diplomate KECIA MAYER

## 2021-10-02 NOTE — PROGRESS NOTES
Navid Thomas presents for weekly or bi-monthly evaluation of Obesity Body mass index is 43.5 kg/m². Visit Vitals  BP (!) 142/88 (BP 1 Location: Left arm, BP Patient Position: Sitting)   Pulse 80   Resp 18   Ht 5' 4\" (1.626 m)   Wt 253 lb 6.4 oz (114.9 kg)   SpO2 98%   BMI 43.50 kg/m²       Wt Readings from Last 3 Encounters:   09/24/21 253 lb 6.4 oz (114.9 kg)   09/10/21 250 lb 8 oz (113.6 kg)   08/23/21 251 lb (113.9 kg)       1. Class 3 severe obesity due to excess calories with serious comorbidity and body mass index (BMI) of 40.0 to 44.9 in adult [facility])         I have reviewed and agree with nurse documentation of Weekly Education Class nurse progress note for [employer] Weight [address] [place] [address] [place]). Navid Thomas is compliant with program requirements and may continue participation utilizing the [employer] meal replacements, as discussed. Patient has been advised to refer to the [facility] Patient Manual and notify us if any side effects to this meal plan are present and/or persist.  Navid Thomas may continue the current dietary approach, care and follow up at the monthly office visit with the provider, as scheduled. Check your blood pressure twice weekly and keep a BP log. Notify your pcp if it consistently is above 140/90 or below 90/60. Take your blood pressure log to your next office visit w your pcp. Go to ER if bottom number (DBP) ever stays above 100 or you experience dizziness, headache, chest pain, extremity weakness, SOB, new swelling or other associated symptoms. Limit any salt, fats, caffeine, alcohol in your diet. Increase your water consumption and fiber consumption. Exercise 5 times weekly for 30 minutes daily, as tolerated. Continue current medications and take them as prescribed. Continue current care and subspecialty follow up. Visit your eye doctor yearly to check your eyes blood pressure related changes.       Follow-up and Dispositions    · Return in about 2 weeks (around 10/8/2021) for weight. Documentation true and accepted by Christina Jimenez.  Ivette Weber, AOBFP, Diplomate KECIA MAYER

## 2021-10-05 NOTE — PROGRESS NOTES
[facility]  Metabolic Weight Loss Program        Patient's Name: Nain Mchugh  : 1965    This patient is enrolled in [address] [place] [address] Weight Loss Program and attended the required weekly virtual nutrition class hosted via [employer] today.       Lucero Jarvis, MS, RD, LDN

## 2021-10-08 ENCOUNTER — CLINICAL SUPPORT (OUTPATIENT)
Dept: SURGERY | Age: 56
End: 2021-10-08

## 2021-10-08 VITALS
DIASTOLIC BLOOD PRESSURE: 98 MMHG | HEIGHT: 64 IN | HEART RATE: 86 BPM | SYSTOLIC BLOOD PRESSURE: 154 MMHG | WEIGHT: 251.1 LBS | OXYGEN SATURATION: 98 % | BODY MASS INDEX: 42.87 KG/M2

## 2021-10-08 DIAGNOSIS — E66.01 CLASS 3 SEVERE OBESITY DUE TO EXCESS CALORIES WITH SERIOUS COMORBIDITY AND BODY MASS INDEX (BMI) OF 40.0 TO 44.9 IN ADULT (HCC): Primary | ICD-10-CM

## 2021-10-08 DIAGNOSIS — I10 ESSENTIAL HYPERTENSION: ICD-10-CM

## 2021-10-08 NOTE — PROGRESS NOTES
1. Have you been to the ER, urgent care clinic since your last visit? Hospitalized since your last visit? No    2. Have you seen or consulted any other health care providers outside of the [address] [place] [address] since your last visit? Include any pap smears or colon screening. No         Progress Note: Weekly Education Class in the [facility] Weight Loss Program         Patient is on Very Low Calorie Diet [] (4 meal replacements per day, 800 kcal/day)      Low Calorie Diet [x] (2-3 meal replacements per day, 6730-8887 kcal/day)    1) Did patient have any new symptoms or physical problems? Yes []    No [x]    If yes, check & comment: weakness [], fatigue [], lightheadedness [], headache [], cramps [], cold intolerance [], hair loss [], diarrhea [], constipation [],  NA [] other:                                 2) Has patient had any medical attention from other providers, urgent care or the emergency room this week? Yes []  No [x]       NA [], If yes, why:                                      3) Any other sugar sweetened beverages consumed this week? Yes [x]  No []    4) Did patient have any problems adhering to the diet? Yes [x]  No [] NA []    If yes, Vacation [], Celebrations [], Conferences [], Family Reunions [] other: Work schedule-getting meals/snacks/water in on times                                               5) How many hours of sleep this week? 5-8 (range)  NA []    Number of meal replacements consumed daily? 2-3 (range)  NA []    Average ounces of water patient consumed daily this week (not including shakes)? 45.14     (divide the weekly total by 7)    Did you eat any food outside of the program? Yes [x] No []    Physical Activity Over the Past Week:    Cardio exercise: 0 min  Strength exercise: 0 workouts / week  Number of steps walked per day: 3,217-10,241    How has patient mood overall been this week?  Sad [], Happy [], Stressed [x], Tired [], Content [], NA [], other accomplished, excided           Medications reconciled by nurse Yes [x]  No[]    Patient was given therapeutic recommendations for any noted side effects of their dietary approach based upon [facility] patient manual per providers recommendation. Progress Note: Weekly Education Class in the [facility] Weight Loss Program         Patient is on Very Low Calorie Diet [] (4 meal replacements per day, 800 kcal/day)      Low Calorie Diet [x] (2-3 meal replacements per day, 5934-3766 kcal/day)    1) Did patient have any new symptoms or physical problems? Yes []    No [x]    If yes, check & comment: weakness [], fatigue [], lightheadedness [], headache [], cramps [], cold intolerance [], hair loss [], diarrhea [], constipation [],  NA [] other:                                 2) Has patient had any medical attention from other providers, urgent care or the emergency room this week? Yes []  No [x]       NA [], If yes, why:                                      3) Any other sugar sweetened beverages consumed this week? Yes [x]  No []    4) Did patient have any problems adhering to the diet? Yes [x]  No [] NA []    If yes, Vacation [], Celebrations [], Conferences [], Family Reunions [] other: Work schedule-timming of meals must work on time of meals/shakes                                               5) How many hours of sleep this week? 9  (range)  NA []    Number of meal replacements consumed daily? 3-1 (range)  NA []    Average ounces of water patient consumed daily this week (not including shakes)? 27.7     (divide the weekly total by 7)    Did you eat any food outside of the program? Yes [x] No []    Physical Activity Over the Past Week:    Cardio exercise: 0 min  Strength exercise: 0 workouts / week  Number of steps walked per day: 3842-10,234    How has patient mood overall been this week?  Sad [], Happy [], Stressed [], Tired [], Content [x], NA [], other awsome           Medications reconciled by nurse Yes [x] No[]    Patient was given therapeutic recommendations for any noted side effects of their dietary approach based upon [employer] patient manual per providers recommendation.

## 2021-10-11 NOTE — PROGRESS NOTES
All your lab results are normal except  Your LDL-P (LDL particle) level is increased in this sample. This is a biomarker that represents the actual number of LDL (bad) cholesterol particles in your blood using new technologies to help uncover hidden risks for heart disease. Cholesterol is carried through the bloodstream in containers known as LDL particles. The more LDL particles are present, the more plaque can build up on the artery wall. Studies have shown that elevated LDL particle concentration is associated with increased risk for coronary heart disease, even in the presence of optimal LDL cholesterol values. Statin medication (i.e. Crestor, Zocor, Pravachol, Lipitor) effectively reduce the number of LDL particles. We can discuss this further at your follow up office visit. Please call to schedule your appointment to review your lab results, if you have not already done so. Your sd-LDL (small dense LDL) particle level is increased in this sample. Over 50% of patients who suffer a heart attack or stroke have normal traditional cholesterol levels. This is a biomarker that represents the actual number of LDL (bad) cholesterol particles in your blood using new technologies to help uncover hidden risks for heart disease. These cholesterol particles are smaller than the larger LDL particles. They can enter more easily and damage the artery wall. Studies have shown that elevated sd-LDL particle concentration is associated with increased risk for coronary heart disease even in the presence of optimal LDL cholesterol values. Small LDL-particles may be observed in association with the METABOLIC SYNDROME, INSULIN RESISTANCE and PRE-DIABETES. Decrease carbohydrates in your diet (white bread, white rice, white pasta, white potatoes, sweet drinks and sweet foods), increase green leafy vegetables and protein. Increase water intake. Eat 3-5 small meals daily.   Increase physical activity to 30 minutes daily 5-7 days a week for health benefit. Lose 10 pounds, if you are overweight. Niacin 500 mg up to twice a day with or without use of a statin medication (Crestor, Lipitor, Zocor, Pravachol, Lescol, Mevacor, etc.) and Fibrates have been shown to increase LDL particle size. Metformin 500 mg has been shown to improve Insulin Sensitivity and decrease circulating blood glucose. We can discuss this further at your follow up office visit. Please call to schedule your appointment to review your lab results, if you have not already done so. Your thyroid lab results are abnormal.  You are slightly overtreated. Reduce Synthroid 175 mcg daily to once daily 6 days a week, off on Sundays. Recheck your level in 6-8 weeks. Please contact our office for follow up appointment. As always, Dr. Munira Tracy will discuss your results with your in greater detail and be available to answer any questions at your follow up appointment. Thank you for allowing us the privilege to care for you.

## 2021-10-19 ENCOUNTER — OFFICE VISIT (OUTPATIENT)
Dept: SURGERY | Age: 56
End: 2021-10-19
Payer: COMMERCIAL

## 2021-10-19 VITALS
RESPIRATION RATE: 18 BRPM | HEIGHT: 64 IN | SYSTOLIC BLOOD PRESSURE: 162 MMHG | DIASTOLIC BLOOD PRESSURE: 94 MMHG | TEMPERATURE: 98.1 F | BODY MASS INDEX: 42.63 KG/M2 | HEART RATE: 84 BPM | OXYGEN SATURATION: 99 % | WEIGHT: 249.7 LBS

## 2021-10-19 DIAGNOSIS — Z90.710 S/P TAH (TOTAL ABDOMINAL HYSTERECTOMY): ICD-10-CM

## 2021-10-19 DIAGNOSIS — G47.33 OBSTRUCTIVE SLEEP APNEA SYNDROME: ICD-10-CM

## 2021-10-19 DIAGNOSIS — G89.29 CHRONIC PAIN OF BOTH KNEES: ICD-10-CM

## 2021-10-19 DIAGNOSIS — G89.29 CHRONIC PAIN OF LEFT KNEE: ICD-10-CM

## 2021-10-19 DIAGNOSIS — M79.89 LEG SWELLING: ICD-10-CM

## 2021-10-19 DIAGNOSIS — E89.0 POSTOPERATIVE HYPOTHYROIDISM: ICD-10-CM

## 2021-10-19 DIAGNOSIS — R53.82 CHRONIC FATIGUE: ICD-10-CM

## 2021-10-19 DIAGNOSIS — R63.4 WEIGHT LOSS: ICD-10-CM

## 2021-10-19 DIAGNOSIS — E66.01 CLASS 3 SEVERE OBESITY DUE TO EXCESS CALORIES WITH SERIOUS COMORBIDITY AND BODY MASS INDEX (BMI) OF 40.0 TO 44.9 IN ADULT (HCC): Primary | ICD-10-CM

## 2021-10-19 DIAGNOSIS — Z72.821 INADEQUATE SLEEP HYGIENE: ICD-10-CM

## 2021-10-19 DIAGNOSIS — E89.0 HISTORY OF TOTAL THYROIDECTOMY: ICD-10-CM

## 2021-10-19 DIAGNOSIS — D53.9 ANEMIA ASSOCIATED WITH NUTRITIONAL DEFICIENCY: ICD-10-CM

## 2021-10-19 DIAGNOSIS — E88.81 INSULIN RESISTANCE: ICD-10-CM

## 2021-10-19 DIAGNOSIS — M25.561 CHRONIC PAIN OF BOTH KNEES: ICD-10-CM

## 2021-10-19 DIAGNOSIS — I10 ESSENTIAL HYPERTENSION: ICD-10-CM

## 2021-10-19 DIAGNOSIS — M25.562 CHRONIC PAIN OF BOTH KNEES: ICD-10-CM

## 2021-10-19 DIAGNOSIS — E78.5 DYSLIPIDEMIA: ICD-10-CM

## 2021-10-19 DIAGNOSIS — M25.562 CHRONIC PAIN OF LEFT KNEE: ICD-10-CM

## 2021-10-19 DIAGNOSIS — G47.62 NOCTURNAL LEG CRAMPS: ICD-10-CM

## 2021-10-19 DIAGNOSIS — T78.40XA ALLERGY, INITIAL ENCOUNTER: ICD-10-CM

## 2021-10-19 DIAGNOSIS — E55.9 VITAMIN D DEFICIENCY: ICD-10-CM

## 2021-10-19 DIAGNOSIS — E73.9 LACTOSE INTOLERANCE: ICD-10-CM

## 2021-10-19 PROCEDURE — 99214 OFFICE O/P EST MOD 30 MIN: CPT | Performed by: FAMILY MEDICINE

## 2021-10-19 NOTE — PROGRESS NOTES
[address] [address], [address] [place] [address] 22.  [phone] o  [phone] f    FOLLOW UP MEDICAL EXAMINATION    Method of Delivery:   Face to Face  Patient Name:  Cara Cho, 1965  PCP:  Jocelynn Espinosa, PA    Patient's preferred weight management approach:  LCD    Cara Cho is a 49 y.o. female with Obesity Class 3 Body mass index is 42.86 kg/m². and associated health concerns presents for Weight Management    Patient has been participating in the [employer] Management Program since 07/13/21 using the [employer] New Direction Low Calorie Diet (LCD, 1410-7688 kcal/day). Challenges adhering to the plan over the past month:  Traveling to [address] [place] [address] to be w family in [facility]. Working - too busy to [employer] while at work. Using the bathroom for BM at work.     Patient's goals for participating in this weight management program:   175 lbs and to have BL TKR and get back to myself    Anthropometric Measures Previously    Start Weight:     250 lbs 0 oz     BMI:  42.91 kg/m2   Neck circumference:  15 in    Waist circumference:  42 in   Body fat percentage:  46.6 %       Weight at last appointment on 09/15/21:  247 lbs 0 oz      Anthropometric MeasuresToday  Today's Weight:     249 lbs 11.2 oz    BMI:  42.86 kg/m2    Neck circumference: 15 in   Waist circumference: 42 in  Body fat percentage: 46.6%    Weight Metrics 10/19/2021 10/19/2021 10/8/2021 9/24/2021 9/10/2021 8/23/2021 8/9/2021   Weight - 249 lb 11.2 oz 251 lb 1.6 oz 253 lb 6.4 oz 250 lb 8 oz 251 lb 250 lb   Neck Circ (inches) 15.75 - - - - - -   Waist Measure Inches 45 - - - - - -   Body Fat % 46.2 - - - - - -   BMI - 42.86 kg/m2 43.1 kg/m2 43.5 kg/m2 43 kg/m2 43.08 kg/m2 42.91 kg/m2       Pounds gaineed since the last doctor appointment with our Center a month ago:  2 lbs  Total pounds loss since starting this therapeutic approach on start date: 1 lbs.   Is patient satisfied with his/her progress since the last appointment: I am not as hungry and I am not gorging like I used to do  Factors contributing to weight change:  Staying up all night w family at the [facility]. Not following the plan to a salvador. Having the option to have the bar or shake. SURGICAL HISTORY  Previous Weight Loss Surgery:  0 Date of Surgery and Surgeon:  0    Past Surgical History:   Procedure Laterality Date    HX BUNIONECTOMY      HX  SECTION      HX COLONOSCOPY  2018    [facility]. w colon polypectomy. Due q 5 yrs.  HX HYSTERECTOMY  2017    HX THYROIDECTOMY  2006    due to benign nodules. EATING HABITS  Total calories consumed per day:  ? kcal      Number of Meal replacements consumed per day:  2 [employer] New Direction food products  Negative Side Effects:  0   Are hunger, cravings and appetite controlled:  Not really    Typical Meals:        Breakfast: ND shake then ND Bar       Lunch:  salad       Dinner:  salad or salmon      Snacks: ND bar, Cheetos for crunch, occ Ritz crackers    Barriers to eating meals:  Not having a set lunch time, stress - need crunch  Does patient want to continue use of New Direction meal replacements:  yes    DRINKING HABITS  Average amount of water consumed daily: 48 oz/day  (Goal 2 L or 67 oz daily, minimally) - 8 oz at a time  Amount of caffeine consumed daily: occ Pepsi oz 6   Sugar-sweetened beverages consumed daily (including alcohol, sodas, teas, juices, etc.): 1 small can Pepsi/day  Barriers preventing patient from drinking minimum 2L water/day: I need the bubbles. Work - demands. Social History     Tobacco Use    Smoking status: Passive Smoke Exposure - Never Smoker    Smokeless tobacco: Never Used    Tobacco comment: lived with smoker mom x years til    Vaping Use    Vaping Use: Never used   Substance Use Topics    Alcohol use:  Yes     Alcohol/week: 0.0 standard drinks     Comment: Socially    Drug use: Never         SLEEP HABITS  Total hours of sleep nightly: 6 hours (Goal 7-9 hours/nightly)  Rx or OTC Sleep Aids:  Start OTC Magnesium for leg cramps and sleep w relief  Occupation:  [employer] , lead    Work hours:  *a-6p or 9p - stopped working Saturdays  Night shift work: 0   Personal history of Sleep Apnea:  yes CPAP use:  yes  Sleep Specialist:  Not since 2017  Barriers to getting proper rest:  Tv is on. Casino all night w family. PHYSICAL ACTIVITY HISTORY  Type of physical activity:  0  Barriers limiting physical activity:  It's dark when I get up to go to work and when I get home. BL knee pain dt OA. Trying to lift my leg to get on the bike. Will see ortho 11/04/21. Mobile Apps used for meal planning, calorie counting, water consumption, sleep, or physical activity:  0     WEIGHT LOSS MEDICATION HISTORY  Current weight loss medication:  0   Weight loss medication prescribed by our office:  0      Outpatient Medications Marked as Taking for the 10/19/21 encounter (Office Visit) with Dani Oakes, DO   Medication Sig Dispense Refill    ergocalciferol (ERGOCALCIFEROL) 1,250 mcg (50,000 unit) capsule Take 1 Capsule by mouth every seven (7) days. Indications: low vitamin D levels 15 Capsule 1    levothyroxine (SYNTHROID) 175 mcg tablet Take 175 mcg by mouth daily.  diclofenac (VOLTAREN) 1 % gel Apply  to affected area three (3) times daily as needed.  atorvastatin (LIPITOR) 20 mg tablet Take 20 mg by mouth daily. Indications: high cholesterol      losartan (COZAAR) 50 mg tablet Take 50 mg by mouth daily.        Medications that cause weight gain:  Lipitor, Losartan  Medications that cause weight loss:  0  Any changes to medications since last office visit with me:  0    Allergies   Allergen Reactions    Ciprofloxacin Other (comments)     Pain     Doxycycline Other (comments)     Stomach pain    Erythromycin Other (comments)     Stomach pain    Esgic [Butalbital-Acetaminophen-Caff] Other (comments)     Stomach pain       BEHAVIORAL HEALTH HISTORY  DEPRESSION SCREENING:    3 most recent PHQ Screens 10/19/2021   Little interest or pleasure in doing things Not at all   Feeling down, depressed, irritable, or hopeless Not at all   Total Score PHQ 2 0       Any history of mental health conditions (including Depression, Anxiety, Anorexia, Bulimia or Binge Eating disorder): 0  Treating provider and medication(s):0  Is mental health condition controlled: yes    Any current major lifestyle changes or stressors:   Not having mom with us at the [address] [place] [address] Street. KJ - employee at work creating a hostile environment. OTHER MEDICAL CARE SINCE LAST OFFICE VISIT  Saw foot doctor. Recommends surgery on L foot again. She has a hx of HTN. Taking Cozaar 50 mg daily. Already had it today. Tolerating it well. Patient denies vision changes, headaches, dizziness, chest pain, SOB, or swelling. She woke up this morning sneezing and puffy eyes. Thinks her allergies are flaring. Wonders what to do. Patient denies fever, chills, ear pain, dizziness, nasal congestion, nasal discharge, post nasal drainage, sore throat, headache, itchy or watery eyes, chest pain or tightness, SOB, wheezing, cough, GI symptoms, bladder symptoms and body aches. She will also contact her pcp. ASSOCIATED MEDICAL CONDITIONS  Past Medical History:   Diagnosis Date    Anemia 2016    due to fibroids.     Asthma     Degenerative arthritis of knee, bilateral     Dr. Chon Mes[place] High cholesterol     Hypertension 2014    Left knee injury 2016    Dr. Ana Max    Leg swelling     Bilaterally    Sleep apnea     on cpap    Uterine fibroid     Vitamin D deficiency        Family History   Problem Relation Age of Onset    Heart Disease Mother         CHF    Hypertension Mother     Diabetes Father     Heart Disease Father     Hypertension Father     Gout Father     Heart Attack Father 80    Diabetes Paternal Grandfather     Obesity Paternal Grandmother     Heart Disease Sister         Heart palpatations ER visit 2018    No Known Problems Maternal Grandmother     No Known Problems Maternal Grandfather        OB/GYN HISTORY (For Female Patients)  Social History     Substance and Sexual Activity   Sexual Activity Yes    Partners: Male    Birth control/protection: Surgical    Comment: NEENA     OB History        1    Para        Term                AB        Living   1       SAB        TAB        Ectopic        Molar        Multiple        Live Births   1               Menopause:  surgical  LMP:  NEENA    Do you have upcoming travel in the next 6 weeks:  Yes, 2 weeks in [place] w family for Thanksgiving. Patient will also notify the dietician for recommendations during travel. Immunization History   Administered Date(s) Administered    COVID-19, PFIZER, MRNA, LNP-S, PF, 30MCG/0.3ML DOSE 2021, 2021       HEALTH MAINTENANCE  A1c:  SEE RESULTS BELOW     Lipids:  SEE RESULTS BELOW  Depression Screening:  Performed during Initial Visit to [facility]  Colonoscopy:  2018, if patient is over 49 yo  Mammogram:  Due now, if female patient is over 37 yo  Annual Wellness Exam:  To be performed by PCP, when appropriate. ROS  Pt denies hunger, cravings, lack of focus, fatigue, feeling weak, headaches, dizziness, light headedness, nausea, vomiting, diarrhea, constipation, indigestion, rapid heart rate, SOB, low blood sugar, feeling cold, hair loss, rash, fluid retention, leg aches, difficulty sleeping, irritability, mood swings, or other associated symptoms. Review of Systems negative except as noted above in HPI.     PHYSICAL EXAMINATION    Visit Vitals  BP (!) 162/94 (BP 1 Location: Left arm)   Pulse 84   Temp 98.1 °F (36.7 °C)   Resp 18   Ht 5' 4\" (1.626 m)   Wt 249 lb 11.2 oz (113.3 kg)   SpO2 99%   BMI 42.86 kg/m²           Weight Metrics 10/19/2021 10/19/2021 10/8/2021 9/24/2021 9/10/2021 8/23/2021 8/9/2021   Weight - 249 lb 11.2 oz 251 lb 1.6 oz 253 lb 6.4 oz 250 lb 8 oz 251 lb 250 lb   Neck Circ (inches) 15.75 - - - - - -   Waist Measure Inches 45 - - - - - -   Body Fat % 46.2 - - - - - -   BMI - 42.86 kg/m2 43.1 kg/m2 43.5 kg/m2 43 kg/m2 43.08 kg/m2 42.91 kg/m2          Neck Circumference:  Acceptable range for M >16 inches, F>15 inches  Waist Circumference:  Acceptable range for M> 40 inches/102 cm, F > 35 inches, 88 cm  Body Fat: Acceptable range for M 18-24%, F 25-31%    General appearance - Well nourished. Well appearing. Well developed. No acute distress. Obese. Head - Normocephalic. Atraumatic. Eyes - pupils equal and reactive. Extraocular eye movements intact. Sclera anicteric. Mildly injected sclera. Ears - Hearing is grossly normal bilaterally. Nose - normal and patent. No polyps noted. No erythema. No discharge. Mouth - mucous membranes with adequate moisture. Posterior pharynx normal with cobblestone appearance. No erythema, white exudate or obstruction. Neck - supple. Midline trachea. No carotid bruits noted bilaterally. No thyromegaly noted. Chest - clear to auscultation bilaterally anteriorly and posteriorly. No wheezes. No rales or rhonchi. Breath sounds are symmetrical bilaterally. Unlabored respirations. Heart - normal rate. Regular rhythm. Normal S1, S2. No murmur noted. No rubs, clicks or gallops noted. Abdomen - soft and distended. No masses or organomegaly. No rebound, rigidity or guarding. Bowel sounds normal x 4 quadrants. No tenderness noted. Neurological - awake, alert and oriented to person, place, and time and event. Cranial nerves II through XII intact. Clear speech. Muscle strength is +5/5 x 4 extremities. Sensation is intact to light touch bilaterally. Steady gait w slight limp when first standing. Heme/Lymph - peripheral pulses normal x 4 extremities. No peripheral edema is noted. Musculoskeletal - Intact x 4 extremities. Mild L knee pain with movement and palpation. Back exam - normal range of motion. No pain on palpation of the spinous processes in the cervical, thoracic, lumbar, sacral regions. No CVA tenderness. No buffalo hump noted. Skin - no rashes, erythema, ecchymosis, lacerations, abrasions, suspicious moles noted. No skin tags or moles. No acanthosis nigricans noted in the axilla or neck. Psychological -   normal behavior, dress and thought processes. Good insight. Good eye contact. Normal affect. Appropriate mood. Normal speech. DATA REVIEWED    Lab Results   Component Value Date/Time    WBC 7.9 07/24/2021 09:56 AM    HGB 13.1 07/24/2021 09:56 AM    HCT 39.6 07/24/2021 09:56 AM    MCV 82 07/24/2021 09:56 AM     Lab Results   Component Value Date/Time    Sodium 140 07/24/2021 09:56 AM    Potassium 4.5 07/24/2021 09:56 AM    Chloride 103 07/24/2021 09:56 AM    CO2 25 07/24/2021 09:56 AM    Glucose 89 07/24/2021 09:56 AM    BUN 11 07/24/2021 09:56 AM    Creatinine 0.89 07/24/2021 09:56 AM    BUN/Creatinine ratio 12 07/24/2021 09:56 AM    GFR est AA 85 07/24/2021 09:56 AM    GFR est non-AA 74 07/24/2021 09:56 AM    Calcium 9.7 07/24/2021 09:56 AM    Bilirubin, total 0.8 07/24/2021 09:56 AM    Alk.  phosphatase 99 07/24/2021 09:56 AM    Protein, total 6.9 07/24/2021 09:56 AM    Albumin 4.4 07/24/2021 09:56 AM    A-G Ratio 1.8 07/24/2021 09:56 AM    ALT (SGPT) 31 07/24/2021 09:56 AM    AST (SGOT) 25 07/24/2021 09:56 AM     Lab Results   Component Value Date/Time    Hemoglobin A1c 5.4 07/24/2021 09:56 AM     Lab Results   Component Value Date/Time    TSH 0.033 (L) 07/24/2021 09:56 AM     Lab Results   Component Value Date/Time    Uric acid 4.2 07/24/2021 09:56 AM     Magnesium   Date Value Ref Range Status   07/24/2021 2.0 1.6 - 2.3 mg/dL Final     Lab Results   Component Value Date/Time    Vitamin B12 304 07/24/2021 09:56 AM    Folate 6.4 07/24/2021 09:56 AM     Lab Results   Component Value Date/Time    VITAMIN D, 25-HYDROXY 22.6 (L) 07/24/2021 09:56 AM     Lab Results   Component Value Date/Time    Iron 57 07/24/2021 09:56 AM     Lab Results   Component Value Date/Time    Cholesterol, total 218 (H) 07/24/2021 09:56 AM    Cholesterol, Total 229 (H) 07/24/2021 09:56 AM    HDL Cholesterol 46 07/24/2021 09:56 AM    LDL, calculated 153 (H) 07/24/2021 09:56 AM    VLDL, calculated 19 07/24/2021 09:56 AM    Triglyceride 103 07/24/2021 09:56 AM   )  Results for orders placed or performed in visit on 07/13/21   NMR LIPOPROFILE WITH LIPIDS (WITHOUT GRAPH)     Status: Abnormal   Result Value Ref Range Status    LDL-P 2,240 (H) <1,000 nmol/L Final     Comment:                           Low                   < 1000                            Moderate         1000 - 1299                            Borderline-High  1300 - 1599                            High             1600 - 2000                            Very High             > 2000      LDL-C(NIH CALC) 162 (H) 0 - 99 mg/dL Final     Comment:                           Optimal               <  100                            Above optimal     100 -  129                            Borderline        130 -  159                            High              160 -  189                            Very high             >  189      HDL-C 49 >39 mg/dL Final    Triglycerides 103 0 - 149 mg/dL Final    Cholesterol, Total 229 (H) 100 - 199 mg/dL Final    HDL-P (Total) 32.9 >=30.5 umol/L Final    Small LDL-P 1,327 (H) <=527 nmol/L Final    LDL size 20.6 >20.5 nm Final     Comment:  ----------------------------------------------------------                   ** INTERPRETATIVE INFORMATION**                   PARTICLE CONCENTRATION AND SIZE                      <--Lower CVD Risk   Higher CVD Risk-->    LDL AND HDL PARTICLES   Percentile in Reference Population    HDL-P (total)        High     75th    50th    25th   Low                         >34.9    34.9 30.5    26.7   <26.7    Small LDL-P          Low      25th    50th    75th   High                         <117     117     527     839    >839    LDL Size   <-Large (Pattern A)->    <-Small (Pattern B)->                      23.0    20.6           20.5      19.0   ----------------------------------------------------------  Small LDL-P and LDL Size are associated with CVD risk, but not after  LDL-P is taken into account. LP-IR SCORE 59 (H) <=45 Final     Comment: INSULIN RESISTANCE MARKER      <--Insulin Sensitive    Insulin Resistant-->             Percentile in Reference Population  Insulin Resistance Score  LP-IR Score   Low   25th   50th   75th   High                <27   27     45     63     >63  LP-IR Score is inaccurate if patient is non-fasting. The LP-IR score is a laboratory developed index that has been  associated with insulin resistance and diabetes risk and should be  used as one component of a physician's clinical assessment. Narrative    Test(s) 108329-OVH-Z; 291458-CNC-N; 163227-Xtokaxipzkhyk; 523104-  Cholesterol, Total; 365560-QTV-E (Total); 884297-Small LDL-P; 177400-  LDL Size; 225027-AF-XY Score  was developed and its performance characteristics determined  by Eder Aguirre. It has not been cleared or approved by the Food  and Drug Administration. Performed at:  [address]  [id number]  : Sebastian Espinosa MD, Phone:  [phone]        EKG:  No results? ASSESSMENT     ICD-10-CM ICD-9-CM    1. Class 3 severe obesity due to excess calories with serious comorbidity and body mass index (BMI) of 40.0 to 44.9 in adult ([facility]  E66.01 278.01     Z68.41 V85.41    2. Essential hypertension  I10 401.9     uncontrolled on Cozaar 50 mg daily, Asymptomatic   3. Chronic fatigue  R53.82 780.79     due to inadequate sleep vs JEY vsvit D def vs other   4. Dyslipidemia  E78.5 272.4     w elevated LDLP and sdLDLP   5.  Obstructive sleep apnea syndrome G47.33 327.23    6. Allergy, initial encounter  T78.40XA 995.3     started sneezing this morning   7. Vitamin D deficiency  E55.9 268.9    8. Inadequate sleep hygiene  Z72.821 307.49     due to habits vs other   9. Postoperative hypothyroidism  E89.0 244.0     stable on Synthroid 175 mcg daily   10. Lactose intolerance  E73.9 271.3     after consuming ice cream, ND protein shakes   11. Insulin resistance  E88.81 277.7    12. Weight loss  R63.4 783.21    13. Leg swelling  M79.89 729.81    14. Anemia associated with nutritional deficiency  D53.9 281.9    15. Chronic pain of both knees  M25.561 719.46     M25.562 338.29     G89.29     16. Nocturnal leg cramps  G47.62 327.52    17. BMI 40.0-44.9, adult ([address]  Z68.41 V85.41    18. S/P NEENA (total abdominal hysterectomy)  Z90.710 V88.01    19. History of total thyroidectomy  E89.0 246.8    20. Chronic pain of left knee  M25.562 719.46     G89.29 338.29        We discussed the expected course, resolution and complications of the diagnosis(es) in detail. WEIGHT MANAGEMENT PLAN    Chart was reviewed and updated during the office visit today. Sukhjinder Hernandez has fulfilled [facility] program requirements this month by completing homework forms, attending educational classes, nurse triage visits and monthly provider appointments as agreed and remains in good standing. Reviewed and appreciate registered dietician note for nutritional counseling. Patient has attended all requirements of the program over the past month and is in good standing. Reviewed and appreciate bi-monthly nurse visits and agree with documentation. Followed up on any patient concerns today. Emphasized the importance of the patient continuing care from current primary care provider and other specialists while participating in this weight management program.    Patient has full access to all our office notes, orders, lab results on [facility] and can provide them copies, if desired. Advised patient to follow up with pcp for any acute symptoms and Health Maintenance. Reviewed and discussed most recent lab results. Reviewed most recent EKG result. - none found. If either are not available today, advised patient to sign a release to provide our program a copy of each. Informed patient that an EKG will be performed for every 50 lbs of weight loss. Recheck pertinent labs every 3 months while participating in LCD using [employer]irection meal replacements, as discussed to identify electrolyte abnormalities and guide therapeutic approach. An order form for pertinent labs was given to patient today. Patient was advised to have the labwork performed 1 week prior to that appointment with me. Advised patient to sign up for FileThis and view lab results directly. Notify me by [facility] if any questions or concerns arise. Labs ordered today will be reviewed in detail at the next office visit and time allowed for any questions regarding the results. Discussed the patient's medications, BMI, anthropometric measures and goals with patient. Informed patient that weight loss goal of 5-10% in 6-12 months has shown significant improvement in obesity and its related health conditions including DM, heart disease, asthma. A key study published in Arthritis & Rheumatism of Overweight and Obese Adults with OA found that losing 1 pound of weight resulted in 4 pounds of pressure being removed from the knees. Continue current medications as directed by prescribers. Take an additional Cozaar 50 mg daily (100 mg daily total) and follow up w pcp since bp has been consistently too high. Identified and discussed medications the patient is taking that can cause weight gain or weight loss as recorded on patient's medication list.  Advised patient not to stop use of any without discussing with the prescribing provider.     Recommended patient to ask prescribing providers to consider more weight neutral or weight negative options. Will monitor patient's weight and health with continued use of current medications. Supplement recommendations: Take OTC Magnesium 400 mg po at night prn sleep, muscle cramping, constipation. Take OTC vit B12 1000 mcg daily orally if taking Metformin or experiencing numbness and tingling. Take OTC Fish Oil 1000 mg with EPA and -1,000 mg daily if experiencing dry skin, low HDL. Use with caution if history of heartburn exists. Increase fiber products (I.e. fiber tea, fiber shakes) or try OTC Fiber products (I.e. Benefiber, Metamucil, psyllium, etc) if experiencing constipation. Take OTC Lactaid with meal replacements, since showing signs of lactose intolerance. May need to stop consuming any offending products. - try pudding, bars or soups, cautiously if bp is still elevated. Referred patient to [facility] [employer] Direction Patient Manual for recommended antidotes. If any new symptoms or side effects have been experienced once dietary approach is initiated, advised patient to notify our office. Dietary Prescription:    Recommended meal plan:  [employer]irection Low Calorie Dietary approach, 6767-4182 kcal/day, 2-3 meal replacements daily with 1 \"grocery\" meal.   Make sure proper daily calories are consumed for each meal.  Encouraged patient to stay within the recommended amount of calories per day   Reviewed nutrition and importance of regular protein intake and minimizing hidden carbohydrate sources. Decrease simple carbohydrates in any regular meal allowed (white foods, sweet foods, sweet drinks and alcohol), increase green leafy vegetables and protein (lean meats and beans) with each meal.    Avoid fried foods and limit saturated fats. Do not skip meals. Eat meals within 3-4 hours to prevent low blood sugar events.     Emphasized importance of drinking a minimum of 2 L (67 oz) of water daily up to half your body weight in ounces of water while utilizing this dietary approach to prevent dehydration and potential side effects. Consider numbering your 8 oz water bottles in the morning and placing them in a safe designated space in the fridge at work to help keep up w how much water you are consuming daily. You will need at least 8-10 bottles/day. Avoid water enhancers or sugar-sweetened beverages including alcohol, juice, soda, lemonade. Try OTC water infusion recipes. Limit caffeine, will allow 1 8 oz cup of black coffee or green tea (to stimulate release of Adiponectin and promote fat burning.)     Exercise Prescription:   Emphasized importance of reducing sedentary time and performing physical activity at least 5 days a week. A goal of 30 minutes physical activity daily is recommended for health benefit and at least 60 minutes daily to prevent weight regain. During weight loss phase, no less than 75% of this time needs to be performing aerobic (i.e. Walking) activity with no more than 25% of the time performing resistance exercises (i.e. Weight lifting, strengthening, toning). During weight maintenance phase, 50% of the physical activity time needs to be spent performing aerobic activity with 50% of the total time performing resistance exercises. Behavior and Lifestyle Prescription:  Counseled patient on stressors, stress management and self-care approaches. If already receiving formal counseling please continue these sessions routinely. Go to ER if any thoughts or attempts of suicide are experienced. Referred patient to work with a counselor for further evaluation and intervention. Patient expressed appreciation. Sleep Prescription:   A goal of 7-9 hours nightly of uninterrupted sleep is recommended to turn off the Grehlin hormone to be released from the stomach and triggers appetite while promoting weight gain. Proper rest turns on Leptin hormone to be released from white adipose tissue and promotes weight loss.    Avoid alcohol and caffeine 6-12 hours before bedtime to reduce risk of sleep disturbance and bladder irritation while asleep. Discussed snoring, symptoms of Sleep Apnea and improvements with weight loss. Strongly encouraged compliance with CPAP, if Sleep Apnea has been diagnosed. Advised patient to follow up with sleep specialist for every 25 pounds lost to see if CPAP settings need to be adjusted. Counseled patient on health topics:    Obesity, Insulin Resistance, LCD dietary approaches, benefits, contraindications, possible side effects to these diets and how to prevent poor outcomes (including staying hydrated, limit physical exercise while transitioning into this program, avoid skipping meals, etc), weight loss goals, sleep hygiene, barriers to losing weight and keeping weight off, strategies to overcome habits or challenges to improve or continue adherence. Immunizations noted. Influenza and Covid-19 vaccines recommended, if patient has not had it. Obesity may increase risk for severe illness and death from Covid-19 disease. Offered empathy, encouragement, legitimation, prayers, partnership to patient. Praised patient for successes. Patient was offered a choice/choices in the treatment plan today. Patient expressed understanding with the diagnoses and the plan and agrees with recommendations. Return in about 1 month (around 11/19/2021) for ND LCD, results, blood pressure. Total time:  35 mins spent in counseling, coordinating care, reviewing and discussing results, reviewing relevant documentation from other providers, completing relevant documentation, and discussing treatment plans in reference to The primary encounter diagnosis was Class 3 severe obesity due to excess calories with serious comorbidity and body mass index (BMI) of 40.0 to 44.9 in adult [facility]).  Diagnoses of Essential hypertension, Chronic fatigue, Dyslipidemia, Obstructive sleep apnea syndrome, Allergy, initial encounter, Vitamin D deficiency, Inadequate sleep hygiene, Postoperative hypothyroidism, Lactose intolerance, Insulin resistance, Weight loss, Leg swelling, Anemia associated with nutritional deficiency, Chronic pain of both knees, Nocturnal leg cramps, BMI 40.0-44.9, adult ([address] S/P NEENA (total abdominal hysterectomy), History of total thyroidectomy, and Chronic pain of left knee were also pertinent to this visit. Turner Shreya, PA FOR ALLOWING ME THE PRIVILEGE TO PARTICIPATE IN THE CARE OF OUR MUTUAL PATIENT, Ms. Contreras WITH RESPECT TO WEIGHT MANAGEMENT. Zabrina Jin DO, Diplomate KECIA MAYER    Patient Instructions          Elevated Blood Pressure: Care Instructions  Your Care Instructions    Blood pressure is a measure of how hard the blood pushes against the walls of your arteries. It's normal for blood pressure to go up and down throughout the day. But if it stays up over time, you have high blood pressure. Two numbers tell you your blood pressure. The first number is the systolic pressure. It shows how hard the blood pushes when your heart is pumping. The second number is the diastolic pressure. It shows how hard the blood pushes between heartbeats, when your heart is relaxed and filling with blood. An ideal blood pressure in adults is less than 120/80 (say \"120 over 80\"). High blood pressure is 140/90 or higher. You have high blood pressure if your top number is 140 or higher or your bottom number is 90 or higher, or both. The main test for high blood pressure is simple, fast, and painless. To diagnose high blood pressure, your doctor will test your blood pressure at different times. After testing your blood pressure, your doctor may ask you to test it again when you are home. If you are diagnosed with high blood pressure, you can work with your doctor to make a long-term plan to manage it. Follow-up care is a key part of your treatment and safety.  Be sure to make and go to all appointments, and call your doctor if you are having problems. It's also a good idea to know your test results and keep a list of the medicines you take. How can you care for yourself at home? · Do not smoke. Smoking increases your risk for heart attack and stroke. If you need help quitting, talk to your doctor about stop-smoking programs and medicines. These can increase your chances of quitting for good. · Stay at a healthy weight. · Try to limit how much sodium you eat to less than 2,300 milligrams (mg) a day. Your doctor may ask you to try to eat less than 1,500 mg a day. · Be physically active. Get at least 30 minutes of exercise on most days of the week. Walking is a good choice. You also may want to do other activities, such as running, swimming, cycling, or playing tennis or team sports. · Avoid or limit alcohol. Talk to your doctor about whether you can drink any alcohol. · Eat plenty of fruits, vegetables, and low-fat dairy products. Eat less saturated and total fats. · Learn how to check your blood pressure at home. When should you call for help? Call your doctor now or seek immediate medical care if:  ? · Your blood pressure is much higher than normal (such as 180/110 or higher). ? · You think high blood pressure is causing symptoms such as:  ¨ Severe headache. ¨ Blurry vision. ? Watch closely for changes in your health, and be sure to contact your doctor if:  ? · You do not get better as expected. Where can you learn more? Go to http://www.gray.com/. Enter I393 in the search box to learn more about \"Elevated Blood Pressure: Care Instructions. \"  Current as of: September 21, 2016  Content Version: 11.4  © 6483-3117 [employer] Care instructions adapted under license by [employer] (which disclaims liability or warranty for this information).  If you have questions about a medical condition or this instruction, always ask your healthcare professional. [employer] disclaims any warranty or liability for your use of this information. DASH Diet: Care Instructions  Your Care Instructions     The DASH diet is an eating plan that can help lower your blood pressure. DASH stands for Dietary Approaches to Stop Hypertension. Hypertension is high blood pressure. The DASH diet focuses on eating foods that are high in calcium, potassium, and magnesium. These nutrients can lower blood pressure. The foods that are highest in these nutrients are fruits, vegetables, low-fat dairy products, nuts, seeds, and legumes. But taking calcium, potassium, and magnesium supplements instead of eating foods that are high in those nutrients does not have the same effect. The DASH diet also includes whole grains, fish, and poultry. The DASH diet is one of several lifestyle changes your doctor may recommend to lower your high blood pressure. Your doctor may also want you to decrease the amount of sodium in your diet. Lowering sodium while following the DASH diet can lower blood pressure even further than just the DASH diet alone. Follow-up care is a key part of your treatment and safety. Be sure to make and go to all appointments, and call your doctor if you are having problems. It's also a good idea to know your test results and keep a list of the medicines you take. How can you care for yourself at home? Following the DASH diet  · Eat 4 to 5 servings of fruit each day. A serving is 1 medium-sized piece of fruit, ½ cup chopped or canned fruit, 1/4 cup dried fruit, or 4 ounces (½ cup) of fruit juice. Choose fruit more often than fruit juice. · Eat 4 to 5 servings of vegetables each day. A serving is 1 cup of lettuce or raw leafy vegetables, ½ cup of chopped or cooked vegetables, or 4 ounces (½ cup) of vegetable juice. Choose vegetables more often than vegetable juice. · Get 2 to 3 servings of low-fat and fat-free dairy each day.  A serving is 8 ounces of milk, 1 cup of yogurt, or 1 ½ ounces of cheese. · Eat 6 to 8 servings of grains each day. A serving is 1 slice of bread, 1 ounce of dry cereal, or ½ cup of cooked rice, pasta, or cooked cereal. Try to choose whole-grain products as much as possible. · Limit lean meat, poultry, and fish to 2 servings each day. A serving is 3 ounces, about the size of a deck of cards. · Eat 4 to 5 servings of nuts, seeds, and legumes (cooked dried beans, lentils, and split peas) each week. A serving is 1/3 cup of nuts, 2 tablespoons of seeds, or ½ cup of cooked beans or peas. · Limit fats and oils to 2 to 3 servings each day. A serving is 1 teaspoon of vegetable oil or 2 tablespoons of salad dressing. · Limit sweets and added sugars to 5 servings or less a week. A serving is 1 tablespoon jelly or jam, ½ cup sorbet, or 1 cup of lemonade. · Eat less than 2,300 milligrams (mg) of sodium a day. If you limit your sodium to 1,500 mg a day, you can lower your blood pressure even more. · Be aware that all of these are the suggested number of servings for people who eat 1,800 to 2,000 calories a day. Your recommended number of servings may be different if you need more or fewer calories. Tips for success  · Start small. Do not try to make dramatic changes to your diet all at once. You might feel that you are missing out on your favorite foods and then be more likely to not follow the plan. Make small changes, and stick with them. Once those changes become habit, add a few more changes. · Try some of the following:  ? Make it a goal to eat a fruit or vegetable at every meal and at snacks. This will make it easy to get the recommended amount of fruits and vegetables each day. ? Try yogurt topped with fruit and nuts for a snack or healthy dessert. ? Add lettuce, tomato, cucumber, and onion to sandwiches. ? Combine a ready-made pizza crust with low-fat mozzarella cheese and lots of vegetable toppings.  Try using tomatoes, squash, spinach, broccoli, carrots, cauliflower, and onions. ? Have a variety of cut-up vegetables with a low-fat dip as an appetizer instead of chips and dip. ? Sprinkle sunflower seeds or chopped almonds over salads. Or try adding chopped walnuts or almonds to cooked vegetables. ? Try some vegetarian meals using beans and peas. Add garbanzo or kidney beans to salads. Make burritos and tacos with mashed tavarez beans or black beans. Where can you learn more? Go to http://www.gray.com/        Enter H967 in the search box to learn more about \"DASH Diet: Care Instructions. \"  Current as of: April 29, 2021               Content Version: 13.0  © 5246-6332 [employer]. Care instructions adapted under license by [employer] (which disclaims liability or warranty for this information). If you have questions about a medical condition or this instruction, always ask your healthcare professional. [place] [address] any warranty or liability for your use of this information. Check BP twice weekly. Notify me if it consistently is above 140/90 or below 90/60. Bring your blood pressure log to your next office visit. Decrease salt, fats, caffeine, alcohol in your diet. Increase water, fiber. Exercise 5 times weekly for 30 minutes daily as tolerated. Continue current medications, care and subspecialty follow up. Visit eye doctor yearly to check your eyes blood pressure related changes.

## 2021-10-19 NOTE — PATIENT INSTRUCTIONS
Elevated Blood Pressure: Care Instructions  Your Care Instructions    Blood pressure is a measure of how hard the blood pushes against the walls of your arteries. It's normal for blood pressure to go up and down throughout the day. But if it stays up over time, you have high blood pressure. Two numbers tell you your blood pressure. The first number is the systolic pressure. It shows how hard the blood pushes when your heart is pumping. The second number is the diastolic pressure. It shows how hard the blood pushes between heartbeats, when your heart is relaxed and filling with blood. An ideal blood pressure in adults is less than 120/80 (say \"120 over 80\"). High blood pressure is 140/90 or higher. You have high blood pressure if your top number is 140 or higher or your bottom number is 90 or higher, or both. The main test for high blood pressure is simple, fast, and painless. To diagnose high blood pressure, your doctor will test your blood pressure at different times. After testing your blood pressure, your doctor may ask you to test it again when you are home. If you are diagnosed with high blood pressure, you can work with your doctor to make a long-term plan to manage it. Follow-up care is a key part of your treatment and safety. Be sure to make and go to all appointments, and call your doctor if you are having problems. It's also a good idea to know your test results and keep a list of the medicines you take. How can you care for yourself at home? · Do not smoke. Smoking increases your risk for heart attack and stroke. If you need help quitting, talk to your doctor about stop-smoking programs and medicines. These can increase your chances of quitting for good. · Stay at a healthy weight. · Try to limit how much sodium you eat to less than 2,300 milligrams (mg) a day. Your doctor may ask you to try to eat less than 1,500 mg a day. · Be physically active.  Get at least 30 minutes of exercise on most days of the week. Walking is a good choice. You also may want to do other activities, such as running, swimming, cycling, or playing tennis or team sports. · Avoid or limit alcohol. Talk to your doctor about whether you can drink any alcohol. · Eat plenty of fruits, vegetables, and low-fat dairy products. Eat less saturated and total fats. · Learn how to check your blood pressure at home. When should you call for help? Call your doctor now or seek immediate medical care if:  ? · Your blood pressure is much higher than normal (such as 180/110 or higher). ? · You think high blood pressure is causing symptoms such as:  ¨ Severe headache. ¨ Blurry vision. ? Watch closely for changes in your health, and be sure to contact your doctor if:  ? · You do not get better as expected. Where can you learn more? Go to http://www.gray.com/. Enter R641 in the search box to learn more about \"Elevated Blood Pressure: Care Instructions. \"  Current as of: September 21, 2016  Content Version: 11.4  © 1965-0155 [email or web address]. Care instructions adapted under license by [employer] (which disclaims liability or warranty for this information). If you have questions about a medical condition or this instruction, always ask your healthcare professional. [address] any warranty or liability for your use of this information. DASH Diet: Care Instructions  Your Care Instructions     The DASH diet is an eating plan that can help lower your blood pressure. DASH stands for Dietary Approaches to Stop Hypertension. Hypertension is high blood pressure. The DASH diet focuses on eating foods that are high in calcium, potassium, and magnesium. These nutrients can lower blood pressure. The foods that are highest in these nutrients are fruits, vegetables, low-fat dairy products, nuts, seeds, and legumes.  But taking calcium, potassium, and magnesium supplements instead of eating foods that are high in those nutrients does not have the same effect. The DASH diet also includes whole grains, fish, and poultry. The DASH diet is one of several lifestyle changes your doctor may recommend to lower your high blood pressure. Your doctor may also want you to decrease the amount of sodium in your diet. Lowering sodium while following the DASH diet can lower blood pressure even further than just the DASH diet alone. Follow-up care is a key part of your treatment and safety. Be sure to make and go to all appointments, and call your doctor if you are having problems. It's also a good idea to know your test results and keep a list of the medicines you take. How can you care for yourself at home? Following the DASH diet  · Eat 4 to 5 servings of fruit each day. A serving is 1 medium-sized piece of fruit, ½ cup chopped or canned fruit, 1/4 cup dried fruit, or 4 ounces (½ cup) of fruit juice. Choose fruit more often than fruit juice. · Eat 4 to 5 servings of vegetables each day. A serving is 1 cup of lettuce or raw leafy vegetables, ½ cup of chopped or cooked vegetables, or 4 ounces (½ cup) of vegetable juice. Choose vegetables more often than vegetable juice. · Get 2 to 3 servings of low-fat and fat-free dairy each day. A serving is 8 ounces of milk, 1 cup of yogurt, or 1 ½ ounces of cheese. · Eat 6 to 8 servings of grains each day. A serving is 1 slice of bread, 1 ounce of dry cereal, or ½ cup of cooked rice, pasta, or cooked cereal. Try to choose whole-grain products as much as possible. · Limit lean meat, poultry, and fish to 2 servings each day. A serving is 3 ounces, about the size of a deck of cards. · Eat 4 to 5 servings of nuts, seeds, and legumes (cooked dried beans, lentils, and split peas) each week. A serving is 1/3 cup of nuts, 2 tablespoons of seeds, or ½ cup of cooked beans or peas. · Limit fats and oils to 2 to 3 servings each day.  A serving is 1 teaspoon of vegetable oil or 2 tablespoons of salad dressing. · Limit sweets and added sugars to 5 servings or less a week. A serving is 1 tablespoon jelly or jam, ½ cup sorbet, or 1 cup of lemonade. · Eat less than 2,300 milligrams (mg) of sodium a day. If you limit your sodium to 1,500 mg a day, you can lower your blood pressure even more. · Be aware that all of these are the suggested number of servings for people who eat 1,800 to 2,000 calories a day. Your recommended number of servings may be different if you need more or fewer calories. Tips for success  · Start small. Do not try to make dramatic changes to your diet all at once. You might feel that you are missing out on your favorite foods and then be more likely to not follow the plan. Make small changes, and stick with them. Once those changes become habit, add a few more changes. · Try some of the following:  ? Make it a goal to eat a fruit or vegetable at every meal and at snacks. This will make it easy to get the recommended amount of fruits and vegetables each day. ? Try yogurt topped with fruit and nuts for a snack or healthy dessert. ? Add lettuce, tomato, cucumber, and onion to sandwiches. ? Combine a ready-made pizza crust with low-fat mozzarella cheese and lots of vegetable toppings. Try using tomatoes, squash, spinach, broccoli, carrots, cauliflower, and onions. ? Have a variety of cut-up vegetables with a low-fat dip as an appetizer instead of chips and dip. ? Sprinkle sunflower seeds or chopped almonds over salads. Or try adding chopped walnuts or almonds to cooked vegetables. ? Try some vegetarian meals using beans and peas. Add garbanzo or kidney beans to salads. Make burritos and tacos with mashed tavarez beans or black beans. Where can you learn more? Go to http://www.gray.com/        Enter H967 in the search box to learn more about \"DASH Diet: Care Instructions. \"  Current as of: April 29, 2021               Content Version: 13.0  © 2006-2021 [employer]. Care instructions adapted under license by [employer] (which disclaims liability or warranty for this information). If you have questions about a medical condition or this instruction, always ask your healthcare professional. [address] any warranty or liability for your use of this information. Check BP twice weekly. Notify me if it consistently is above 140/90 or below 90/60. Bring your blood pressure log to your next office visit. Decrease salt, fats, caffeine, alcohol in your diet. Increase water, fiber. Exercise 5 times weekly for 30 minutes daily as tolerated. Continue current medications, care and subspecialty follow up. Visit eye doctor yearly to check your eyes blood pressure related changes.

## 2021-10-19 NOTE — LETTER
NOTIFICATION RETURN TO WORK / SCHOOL    10/19/2021 11:12 AM    Ms. Toni Dias  [address] [address]      To Whom It May Concern:    Toni Dias is currently under the care of [facility]. She will return to work/school on: 10/19/21    If there are questions or concerns please have the patient contact our office.         Sincerely,      Joni Parra, DO

## 2021-10-19 NOTE — LETTER
NOTIFICATION RETURN TO WORK    10/19/2021 11:14 AM    Ms. Naif Bynum  [address] [id number]      To Whom It May Concern:    Naif Bynum is currently under the care of [facility] Jackson. She was seen in our office today. If there are questions or concerns please have the patient contact our office.     Sincerely,          Tomeka Espinoza, DO

## 2021-10-21 ENCOUNTER — OFFICE VISIT (OUTPATIENT)
Dept: SURGERY | Age: 56
End: 2021-10-21

## 2021-10-21 DIAGNOSIS — E66.01 CLASS 3 SEVERE OBESITY DUE TO EXCESS CALORIES WITH SERIOUS COMORBIDITY AND BODY MASS INDEX (BMI) OF 40.0 TO 44.9 IN ADULT (HCC): Primary | ICD-10-CM

## 2021-10-26 ENCOUNTER — CLINICAL SUPPORT (OUTPATIENT)
Dept: SURGERY | Age: 56
End: 2021-10-26

## 2021-10-26 DIAGNOSIS — E66.01 MORBID OBESITY (HCC): Primary | ICD-10-CM

## 2021-10-26 NOTE — PROGRESS NOTES
[facility]  Metabolic Weight Loss Program        Patient's Name: Jacqulynn Ganser  : 1965    This patient is enrolled in [address] [place] [address] Weight Loss Program and attended the required weekly virtual nutrition class hosted via [employer].       Tommy Chan, MS, RD, LDN

## 2021-10-26 NOTE — PROGRESS NOTES
[facility]  Metabolic Program Follow-up Nutrition Consult    Date: 10/26/2021   Physician: Dr. Zenia Do  Name: Shahram Rodgers  :  1965    Type of Plan: LCD  Weeks on Plan: ~12 weeks  Virtual visit was completed through [employer]. ASSESSMENT:    Medications/Supplements:   Prior to Admission medications    Medication Sig Start Date End Date Taking? Authorizing Provider   ergocalciferol (ERGOCALCIFEROL) 1,250 mcg (50,000 unit) capsule Take 1 Capsule by mouth every seven (7) days. Indications: low vitamin D levels 21   Odilia Roldan R, DO   levothyroxine (SYNTHROID) 175 mcg tablet Take 175 mcg by mouth daily. Provider, Historical   diclofenac (VOLTAREN) 1 % gel Apply  to affected area three (3) times daily as needed. 21   Provider, Historical   atorvastatin (LIPITOR) 20 mg tablet Take 20 mg by mouth daily. Indications: high cholesterol    Provider, Historical   losartan (COZAAR) 50 mg tablet Take 50 mg by mouth daily. Provider, Historical              Starting Weight: 252 lbs   Current Weight: 248 lbs (pt reported)  Overall Pounds Lost: 4 lbs Overall Pounds Gained: 0    Exercise/Physical Activity: small amounts of walking; d/c P.T. Recently got cortisone shot and pain decreased. Is patient using New Directions products: Yes If yes, how many per day: 2 shakes    Aversions/side effects of product/program:None    Fluids used to mix with products:water    Reported Diet History: Drinking 2 ND protein shakes per day (1 at bfast; 1 on way home from work); no ND bar for snack. States finishing shake before beginning drive home and stops at [facility] for  and gets fries for  and self; or may stop at [facility] for chicken salad sandwich. Pt states not hungry at this time but is using food to de-stress from work. Also states stress eating at work at times (pbutter crackers in drawer). Drinking 8 oz Pepsi occasionally.      24 Hour Diet Recall  Breakfast  ND shake   Lunch  Salad w/protein   Dinner 8pm ND shake   Snacks  Ritz crackers occasionally   Beverages  Water-64 oz; 8 oz Pepsi occasionally     Barriers/concerns preventing patient from achieving goal(s) since last visit: had a vacation and got off track with diet; eating emotionally      NUTRITION INTERVENTION:  Pt educated on nutrition recommendations for the New Direction Low Calorie Plan (LCD), with the specific meal pattern of 2 ND protein shakes, 1 ND bar (snack), 1 Grocery meal/day;  64 oz fluid/day. Discussed adding in ND bar for snack in afternoon and delaying ND shake (for dinner) when close to home- so if stopping for food for  has shake to drink. Pt states will start drinking shake with straw to slow down the pace of completing the shake. Recommended adding in non-food behaviors for stress eating. Encouraged pt to begin exercise program- pt may use peddler, recumbent bike or chair exercises. Grocery meal:  Use the balanced plate method to plan meals, include 3-6 oz of lean source of protein, unlimited non-starchy vegetables, 1/2 cup whole grains/beans OR 1/2 cup fruit OR 1 serving of low fat dairy. Utilize handouts listing healthy snack and meal ideas. Read all nutrition labels. Demonstrated and emphasized identifying serving size, total fat, sugar and protein content. Defined low fat as </= 3 g per serving. Discussed lean and extra lean sources of protein. Avoid foods with sugar listed in the first 3 ingredients and >/10 g sugar per serving. Consume meal replacements every three to four hours or pattern as discussed with provider. Mix with water. May add herbs/spices for taste. Practice mindful eating habits; take small bites, chew thoroughly, avoid distractions, utilize hunger/fullness scale. Reviewed attendance policy of attending weekly nutrition classes.   Metabolic support group recommended, and increase physical activity (approved per MD) for long term weight maintenance. NUTRITION MONITORING AND EVALUATION:    The following goals were established with patient;  1. Drink protein shake later in drive home from work  2. Add in ND bar as snack between lunch and dinner  3. Start exercise program- peddler, recumbent bike, chair exercises  4. Sub in non-food related activities for stress eating. Specific tips and techniques to facilitate compliance with above recommendations were provided and discussed. If further details are desired please contact me at [phone]. This phone number was also provided to the patient for any further questions or concerns.           John Chicas RD

## 2021-11-01 ENCOUNTER — CLINICAL SUPPORT (OUTPATIENT)
Dept: SURGERY | Age: 56
End: 2021-11-01

## 2021-11-01 ENCOUNTER — TELEPHONE (OUTPATIENT)
Dept: SURGERY | Age: 56
End: 2021-11-01

## 2021-11-01 VITALS
TEMPERATURE: 97.9 F | SYSTOLIC BLOOD PRESSURE: 156 MMHG | DIASTOLIC BLOOD PRESSURE: 88 MMHG | BODY MASS INDEX: 42.94 KG/M2 | OXYGEN SATURATION: 97 % | HEIGHT: 64 IN | HEART RATE: 78 BPM | RESPIRATION RATE: 20 BRPM | WEIGHT: 251.5 LBS

## 2021-11-01 DIAGNOSIS — E66.01 CLASS 3 SEVERE OBESITY DUE TO EXCESS CALORIES WITH SERIOUS COMORBIDITY AND BODY MASS INDEX (BMI) OF 40.0 TO 44.9 IN ADULT (HCC): Primary | ICD-10-CM

## 2021-11-01 NOTE — PROGRESS NOTES
Sofi Arreola presents for weekly or bi-monthly evaluation of Obesity Body mass index is 43.1 kg/m². Visit Vitals  BP (!) 154/98 Comment: pt takes meds at night. inst. to follow up with pcp   Pulse 86   Ht 5' 4\" (1.626 m)   Wt 251 lb 1.6 oz (113.9 kg)   SpO2 98%   BMI 43.10 kg/m²       Wt Readings from Last 3 Encounters:   11/01/21 251 lb 8 oz (114.1 kg)   10/19/21 249 lb 11.2 oz (113.3 kg)   10/08/21 251 lb 1.6 oz (113.9 kg)       No diagnosis found. I have reviewed and agree with nurse documentation of Weekly Education Class nurse progress note for [employer] [facility] [address] [facility] [place]). Sofi Arreola is compliant with program requirements and may continue participation utilizing the New Direction meal replacements, as discussed. Patient has been advised to refer to the [facility] [place] Patient Manual and notify us if any side effects to this meal plan are present and/or persist.  Sofi Arreola may continue the current dietary approach, care and follow up at the monthly office visit with the provider, as scheduled. Check your BP twice weekly. Notify your pcp or cardiologist if it consistently is above 140/90 or below 90/60. Take your blood pressure log to your next office visit. Decrease salt, fat, caffeine, and alcohol in your diet. Increase water, fiber. De-stress and aim to get 7-9 hours sleep nightly. Exercise 5 times weekly for 30 minutes daily as tolerated. Start taking an additional Cozaar 50 mg for a total of 100 mg daily and follow up with your pcp. Continue your current medications, care and subspecialty follow up. Follow-up and Dispositions    · Return in about 2 weeks (around 10/22/2021) for weight check. Documentation true and accepted by Gavin Crespo.  Anju Guy., AOBFP, Diplomate KECIA MAYER

## 2021-11-01 NOTE — PROGRESS NOTES
1. Have you been to the ER, urgent care clinic since your last visit? Hospitalized since your last visit? No    2. Have you seen or consulted any other health care providers outside of the [address] [place] [address] since your last visit? Include any pap smears or colon screening. No     Pt Blood Pressure is high. Pt Pt denied blurry vision, dizziness, chest pain arm pain or weakness. Denied swelling in legs. Writer requested pt follow up with her PCP for high blood pressure reading. Pt stated understanding. Progress Note: Weekly Education Class in the [facility]         Patient is on Very Low Calorie Diet [] (4 meal replacements per day, 800 kcal/day)      Low Calorie Diet [x] (2-3 meal replacements per day, 5591-5799 kcal/day)    1) Did patient have any new symptoms or physical problems? Yes []    No [x]    If yes, check & comment: weakness [], fatigue [x], lightheadedness [], headache [], cramps [], cold intolerance [], hair loss [], diarrhea [], constipation [],  NA [] other: due to work schedule lack of sleep                                2) Has patient had any medical attention from other providers, urgent care or the emergency room this week? Yes []  No [x]       NA [], If yes, why:                                      3) Any other sugar sweetened beverages consumed this week? Yes [x]  No []    4) Did patient have any problems adhering to the diet? Yes [x]  No [] NA []    If yes, Vacation [], Celebrations [], Conferences [], Family Reunions [] other: work schedule/ lack of sit time available to consume snacks                                               5) How many hours of sleep this week? 2-3    (range)  NA []    Number of meal replacements consumed daily? 2-3 (range)  NA []    Average ounces of water patient consumed daily this week (not including shakes)?  51   (divide the weekly total by 7)    Did you eat any food outside of the program? Yes [x] No []    Physical Activity Over the Past Week:    Cardio exercise: 0 min  Strength exercise: 0 workouts / week  Number of steps walked per day: 3839-67,791    How has patient mood overall been this week? Sad [], Happy [], Stressed [], Tired [x], Content [], NA [], other  Excited, hubby home         Medications reconciled by nurse Yes []  No[]    Patient was given therapeutic recommendations for any noted side effects of their dietary approach based upon [employer] patient manual per providers recommendation.

## 2021-11-03 NOTE — PROGRESS NOTES
Pt returned call. Verified pt. Name and date of birth. Per Dr. Gomez Natarajan request writer notified pt to follow up with her PCP as soon as possible and to take two of the cozaar 50mg until she can follow up with her PCP. Pt indicated to writer that she would call to her PCP office today and also start taking two of the Cozaar.

## 2021-11-12 LAB
25(OH)D3+25(OH)D2 SERPL-MCNC: 37.9 NG/ML (ref 30–100)
ALBUMIN SERPL-MCNC: 4.4 G/DL (ref 3.8–4.9)
ALBUMIN/GLOB SERPL: 1.9 {RATIO} (ref 1.2–2.2)
ALP SERPL-CCNC: 105 IU/L (ref 44–121)
ALT SERPL-CCNC: 25 IU/L (ref 0–32)
AST SERPL-CCNC: 20 IU/L (ref 0–40)
BILIRUB SERPL-MCNC: 1.2 MG/DL (ref 0–1.2)
BUN SERPL-MCNC: 15 MG/DL (ref 6–24)
BUN/CREAT SERPL: 15 (ref 9–23)
CALCIUM SERPL-MCNC: 9.3 MG/DL (ref 8.7–10.2)
CHLORIDE SERPL-SCNC: 104 MMOL/L (ref 96–106)
CHOLEST SERPL-MCNC: 156 MG/DL (ref 100–199)
CHOLEST SERPL-MCNC: 161 MG/DL (ref 100–199)
CO2 SERPL-SCNC: 26 MMOL/L (ref 20–29)
CREAT SERPL-MCNC: 0.97 MG/DL (ref 0.57–1)
GLOBULIN SER CALC-MCNC: 2.3 G/DL (ref 1.5–4.5)
GLUCOSE SERPL-MCNC: 92 MG/DL (ref 65–99)
HDL SERPL-SCNC: 35 UMOL/L
HDLC SERPL-MCNC: 54 MG/DL
HDLC SERPL-MCNC: 54 MG/DL
INSULIN SERPL-ACNC: 16.3 UIU/ML (ref 2.6–24.9)
LDL SERPL QN: 20.9 NM
LDL SERPL-SCNC: 1157 NMOL/L
LDL SMALL SERPL-SCNC: 364 NMOL/L
LDLC SERPL CALC-MCNC: 89 MG/DL (ref 0–99)
LDLC SERPL CALC-MCNC: 94 MG/DL (ref 0–99)
MAGNESIUM SERPL-MCNC: 2.1 MG/DL (ref 1.6–2.3)
POTASSIUM SERPL-SCNC: 4.4 MMOL/L (ref 3.5–5.2)
PROT SERPL-MCNC: 6.7 G/DL (ref 6–8.5)
SODIUM SERPL-SCNC: 142 MMOL/L (ref 134–144)
T4 FREE SERPL-MCNC: 1.5 NG/DL (ref 0.82–1.77)
TRIGL SERPL-MCNC: 64 MG/DL (ref 0–149)
TRIGL SERPL-MCNC: 64 MG/DL (ref 0–149)
TSH SERPL DL<=0.005 MIU/L-ACNC: 2.5 UIU/ML (ref 0.45–4.5)
URATE SERPL-MCNC: 4.2 MG/DL (ref 3–7.2)
VLDLC SERPL CALC-MCNC: 13 MG/DL (ref 5–40)

## 2021-11-15 ENCOUNTER — OFFICE VISIT (OUTPATIENT)
Dept: SURGERY | Age: 56
End: 2021-11-15
Payer: COMMERCIAL

## 2021-11-15 VITALS
BODY MASS INDEX: 42.9 KG/M2 | RESPIRATION RATE: 20 BRPM | HEART RATE: 76 BPM | TEMPERATURE: 97.9 F | WEIGHT: 251.3 LBS | DIASTOLIC BLOOD PRESSURE: 94 MMHG | HEIGHT: 64 IN | SYSTOLIC BLOOD PRESSURE: 150 MMHG | OXYGEN SATURATION: 98 %

## 2021-11-15 DIAGNOSIS — G47.33 OBSTRUCTIVE SLEEP APNEA SYNDROME: ICD-10-CM

## 2021-11-15 DIAGNOSIS — G89.29 CHRONIC PAIN OF BOTH KNEES: ICD-10-CM

## 2021-11-15 DIAGNOSIS — E78.5 DYSLIPIDEMIA: ICD-10-CM

## 2021-11-15 DIAGNOSIS — R53.82 CHRONIC FATIGUE: ICD-10-CM

## 2021-11-15 DIAGNOSIS — Z72.821 INADEQUATE SLEEP HYGIENE: ICD-10-CM

## 2021-11-15 DIAGNOSIS — E89.0 HISTORY OF TOTAL THYROIDECTOMY: ICD-10-CM

## 2021-11-15 DIAGNOSIS — E73.9 LACTOSE INTOLERANCE: ICD-10-CM

## 2021-11-15 DIAGNOSIS — E55.9 VITAMIN D DEFICIENCY: ICD-10-CM

## 2021-11-15 DIAGNOSIS — M25.562 CHRONIC PAIN OF BOTH KNEES: ICD-10-CM

## 2021-11-15 DIAGNOSIS — D53.9 ANEMIA ASSOCIATED WITH NUTRITIONAL DEFICIENCY: ICD-10-CM

## 2021-11-15 DIAGNOSIS — I10 ESSENTIAL HYPERTENSION: ICD-10-CM

## 2021-11-15 DIAGNOSIS — E66.01 CLASS 3 SEVERE OBESITY DUE TO EXCESS CALORIES WITH SERIOUS COMORBIDITY AND BODY MASS INDEX (BMI) OF 40.0 TO 44.9 IN ADULT (HCC): Primary | ICD-10-CM

## 2021-11-15 DIAGNOSIS — M79.89 LEG SWELLING: ICD-10-CM

## 2021-11-15 DIAGNOSIS — M25.561 CHRONIC PAIN OF BOTH KNEES: ICD-10-CM

## 2021-11-15 DIAGNOSIS — Z90.710 S/P TAH (TOTAL ABDOMINAL HYSTERECTOMY): ICD-10-CM

## 2021-11-15 DIAGNOSIS — E88.81 INSULIN RESISTANCE: ICD-10-CM

## 2021-11-15 DIAGNOSIS — E89.0 POSTOPERATIVE HYPOTHYROIDISM: ICD-10-CM

## 2021-11-15 DIAGNOSIS — G47.62 NOCTURNAL LEG CRAMPS: ICD-10-CM

## 2021-11-15 PROCEDURE — 99214 OFFICE O/P EST MOD 30 MIN: CPT | Performed by: FAMILY MEDICINE

## 2021-11-15 RX ORDER — DICLOFENAC SODIUM 75 MG/1
TABLET, DELAYED RELEASE ORAL
COMMUNITY
Start: 2021-11-04 | End: 2021-11-15

## 2021-11-15 RX ORDER — LOSARTAN POTASSIUM 100 MG/1
50 TABLET ORAL DAILY
COMMUNITY
Start: 2021-11-03 | End: 2022-05-29

## 2021-11-15 NOTE — PROGRESS NOTES
.1. Have you been to the ER, urgent care clinic since your last visit? Hospitalized since your last visit? No    2. Have you seen or consulted any other health care providers outside of the [address] [place] [address] since your last visit? Include any pap smears or colon screening. Yes When: 11/2021- orthopedic knee injection     Progress Note: Weekly Education Class in the [facility]         Patient is on Very Low Calorie Diet [] (4 meal replacements per day, 800 kcal/day)      Low Calorie Diet [x] (2-3 meal replacements per day, 9456-5524 kcal/day)    1) Did patient have any new symptoms or physical problems? Yes []    No []    If yes, check & comment: weakness [], fatigue [], lightheadedness [], headache [], cramps [x], cold intolerance [], hair loss [], diarrhea [], constipation [],  NA [] other:  Sever leg cramps                              2) Has patient had any medical attention from other providers, urgent care or the emergency room this week? Yes []  No [x]       NA [], If yes, why:                                       3) Any other sugar sweetened beverages consumed this week? Yes [x]  No []    4) Did patient have any problems adhering to the diet? Yes [x]  No [] NA []    If yes, Vacation [], Celebrations [], Conferences [], Family Reunions [] other: work hours daily time limits                                               5) How many hours of sleep this week?    (range)  NA [x]    Number of meal replacements consumed daily? 3-2 (range)  NA []    Average ounces of water patient consumed daily this week (not including shakes)? 39   (divide the weekly total by 7)    Did you eat any food outside of the program? Yes [x] No []    Physical Activity Over the Past Week:    Cardio exercise: n/a min  Strength exercise: n/a workouts / week  Number of steps walked per day: [id number]    How has patient mood overall been this week?  Sad [], Happy [], Stressed [], Tired [], Content [x], NA [], other  Content, counting down to vacation          Medications reconciled by nurse Yes [x]  No[]    Patient was given therapeutic recommendations for any noted side effects of their dietary approach based upon [facility] patient manual per providers recommendation. Progress Note: Weekly Education Class in the [facility]         Patient is on Very Low Calorie Diet [] (4 meal replacements per day, 800 kcal/day)      Low Calorie Diet [x] (2-3 meal replacements per day, 9258-0723 kcal/day)    1) Did patient have any new symptoms or physical problems? Yes [x]    No []    If yes, check & comment: weakness [], fatigue [], lightheadedness [], headache [], cramps [], cold intolerance [], hair loss [], diarrhea [], constipation [],  NA [] other: leg cramps unbearable at night                                2) Has patient had any medical attention from other providers, urgent care or the emergency room this week? Yes []  No [x]       NA [], If yes, why:                                      3) Any other sugar sweetened beverages consumed this week? Yes [x]  No []    4) Did patient have any problems adhering to the diet? Yes [x]  No [] NA []    If yes, Vacation [], Celebrations [], Conferences [], Family Reunions [] other: work schedule, and timing to get adjusted with eating and drinking                                               5) How many hours of sleep this week? 4-8   (range)  NA []    Number of meal replacements consumed daily? 1-3(range)  NA []    Average ounces of water patient consumed daily this week (not including shakes)? 39   (divide the weekly total by 7)    Did you eat any food outside of the program? Yes [x] No []    Physical Activity Over the Past Week:    Cardio exercise: n/a min  Strength exercise: n/a workouts / week  Number of steps walked per day: [id number]    How has patient mood overall been this week?  Sad [], Happy [], Stressed [], Tired [], Content [], NA [], other exhausted, excited           Medications reconciled by nurse Yes [x]  No[]    Patient was given therapeutic recommendations for any noted side effects of their dietary approach based upon [employer] patient manual per providers recommendation.

## 2021-11-15 NOTE — PROGRESS NOTES
[address] [place] [address] 22.  [phone] o  [phone] f    FOLLOW UP MEDICAL EXAMINATION    Method of Delivery:   Face to Face  Patient Name:  Jacqulynn Ganser, 1965  PCP:  VERONIKA Schumacher    Patient's preferred weight management approach:  LCD    Jacqulynn Ganser is a 49 y.o. female with Obesity Class 3 Body mass index is 43.14 kg/m². and associated health concerns presents for Weight Management  Patient has been participating in the [employer] Management Program since 07/13/21 using the [employer] New Direction Low Calorie Diet (LCD, 2728-9251 kcal/day). Challenges adhering to the plan over the past month:  Finding a schedule to compliment the LCD. Visited 3 felicitainos this month. Patient's goals for participating in this weight management program:   175 lbs and to have BL TKR and get back to myself    Anthropometric Measures Previously    Start Weight:                                     250 lbs 0 oz                                         BMI:  42.91 kg/m2   Neck circumference:  15 in                          Waist circumference:  42 in              Body fat percentage:  46.6 %       Weight at last appointment on 10/19/21:  249 lbs 11.2 oz        Anthropometric MeasuresToday  Today's Weight:     251 lbs 4.8 oz        Weight Metrics 11/15/2021 11/15/2021 11/1/2021 10/19/2021 10/19/2021 10/8/2021 9/24/2021   Weight - 251 lb 4.8 oz 251 lb 8 oz - 249 lb 11.2 oz 251 lb 1.6 oz 253 lb 6.4 oz   Neck Circ (inches) 15 - - 15.75 - - -   Waist Measure Inches 45 - - 45 - - -   Body Fat % 46.4 - - 46.2 - - -   BMI - 43.14 kg/m2 43.17 kg/m2 - 42.86 kg/m2 43.1 kg/m2 43.5 kg/m2       Pounds loss since the last doctor appointment with our Center a month ago:  0 lbs  Total pounds loss since starting this therapeutic approach on start date: 0 lbs.  - gained 2 lbs  Is patient satisfied with his/her progress since the last appointment: not at all  Factors contributing to weight change:  I haven't really stuck to the LCD. I have had a couple of cocktails this week. SURGICAL HISTORY  Previous Weight Loss Surgery:  0     Past Surgical History:   Procedure Laterality Date    HX BUNIONECTOMY  2010    HX  SECTION  2000    HX COLONOSCOPY  2018    [facility]. w colon polypectomy. Due q 5 yrs.  HX HYSTERECTOMY  2017    HX THYROIDECTOMY  2006    due to benign nodules. EATING HABITS  Total calories consumed per day:  ?2000 kcal  Number of meals consumed from restaurants or fast food weekly:  3  Typical Meals:        Breakfast: ND shake       Lunch:  [employer] chicken salad sandwich 1/2 or [employer] salad       Dinner: skip or ND shake or 8 Ritz crackers      Snacks: grapes, Ritz crackers, Cheetos for crunch      Number of Meal replacements consumed per day:  2 [employer] New Direction food products  Negative Side Effects:  Leg cramps at night - started OTC Magnesium   Are hunger, cravings and appetite controlled:  no  Does patient want to continue use of New Direction meal replacements:  yes  Barriers to eating meals: Work schedule. Being too tired to eat. DRINKING HABITS  Average amount of water consumed daily: 48 oz/day  (Goal 2 L or 67 oz daily, minimally)  Amount of caffeine consumed daily: rarely Pepsi. - I don't even want it anymore   Sugar-sweetened beverages consumed daily (including alcohol, sodas, teas, juices, etc.): as above  Barriers preventing patient from drinking minimum 2L water/day:  Ripping and running at work. I like my water ice cold. Social History     Tobacco Use    Smoking status: Passive Smoke Exposure - Never Smoker    Smokeless tobacco: Never Used    Tobacco comment: lived with smoker mom x years til    Vaping Use    Vaping Use: Never used   Substance Use Topics    Alcohol use:  Yes     Alcohol/week: 0.0 standard drinks     Comment: Socially    Drug use: Never SLEEP HABITS  Total hours of sleep nightly: 6-7 hours (Goal 7-9 hours/nightly), except casino nights  Rx or OTC Sleep Aids:  OTC Magnesium for leg cramps  Occupation:   [employer] , lead          Work hours:  9a-6p or 9p - stopped working Saturdays. On vacation x 2 weeks now. Personal history of Sleep Apnea:  yes CPAP use:  yes  Sleep Specialist:  Not since 2017  Barriers to getting proper rest:  Leg cramps. Casino all night w family. PHYSICAL ACTIVITY HISTORY  Type of physical activity:  Walking at work  Average number steps per day:  10,000 steps/day  Barriers limiting physical activity:  Work schedule. Mobile Apps used for meal planning, calorie counting, water consumption, sleep, or physical activity:  0     WEIGHT LOSS MEDICATION HISTORY  Current weight loss medication:  0     Outpatient Medications Marked as Taking for the 11/15/21 encounter (Office Visit) with Dani Oakes, DO   Medication Sig Dispense Refill    losartan (COZAAR) 100 mg tablet Take 100 mg by mouth daily.  ergocalciferol (ERGOCALCIFEROL) 1,250 mcg (50,000 unit) capsule Take 1 Capsule by mouth every seven (7) days. Indications: low vitamin D levels 15 Capsule 1    levothyroxine (SYNTHROID) 175 mcg tablet Take 175 mcg by mouth daily.  diclofenac (VOLTAREN) 1 % gel Apply  to affected area three (3) times daily as needed.  atorvastatin (LIPITOR) 20 mg tablet Take 20 mg by mouth daily.  Indications: high cholesterol       Medications that cause weight gain:  0  Medications that cause weight loss:  0  Any changes to medications since last office visit with me:  0    Allergies   Allergen Reactions    Ciprofloxacin Other (comments)     Pain     Doxycycline Other (comments)     Stomach pain    Erythromycin Other (comments)     Stomach pain    Esgic [Butalbital-Acetaminophen-Caff] Other (comments)     Stomach pain       BEHAVIORAL HEALTH HISTORY  DEPRESSION SCREENING:    3 most recent PHQ Screens 11/15/2021   Little interest or pleasure in doing things Not at all   Feeling down, depressed, irritable, or hopeless Not at all   Total Score PHQ 2 0       Any history of mental health conditions (including Depression, Anxiety, Anorexia, Bulimia or Binge Eating disorder): 0  Treating provider and medication(s):  0  Any current major lifestyle changes or stressors:   Work. Is mental health condition controlled: yes    OTHER MEDICAL CARE SINCE LAST OFFICE VISIT  She has a hx of HTN. Taking Cozaar 50 mg daily. Already had it today. Tolerating it well. Patient denies vision changes, headaches, dizziness, chest pain, SOB, or swelling. ASSOCIATED MEDICAL CONDITIONS  Past Medical History:   Diagnosis Date    Anemia 2016    due to fibroids.     Asthma     Degenerative arthritis of knee, bilateral     Dr. Cachorro Sauceda High cholesterol     Hypertension     Left knee injury 2016    Dr. Olive Hooks    Leg swelling     Bilaterally    Sleep apnea     on cpap    Uterine fibroid     Vitamin D deficiency        Family History   Problem Relation Age of Onset    Heart Disease Mother         CHF    Hypertension Mother     Diabetes Father     Heart Disease Father     Hypertension Father     Gout Father     Heart Attack Father 80    Diabetes Paternal Grandfather     Obesity Paternal Grandmother     Heart Disease Sister         Heart palpatations ER visit     No Known Problems Maternal Grandmother     No Known Problems Maternal Grandfather        OB/GYN HISTORY (For Female Patients)  Social History     Substance and Sexual Activity   Sexual Activity Yes    Partners: Male    Birth control/protection: Surgical    Comment: NEENA     OB History        1    Para        Term                AB        Living   1       SAB        IAB        Ectopic        Molar        Multiple        Live Births   1               Menopause:  surgical  LMP:  NEENA  Are you pregnant or planning on becoming pregnant within 6 months:  0    Do you have upcoming travel in the next 6 weeks:  [place] in [place].   Patient will also notify the dietician for recommendations during travel. Immunization History   Administered Date(s) Administered    COVID-19, PFIZER, MRNA, LNP-S, PF, 30MCG/0.3ML DOSE 04/03/2021, 04/24/2021       HEALTH MAINTENANCE  A1c:  SEE RESULTS BELOW     Lipids:  SEE RESULTS BELOW  Depression Screening:  Performed during [facility]  Colonoscopy:  2018, if patient is over 49 yo  Mammogram:  ?, if female patient is over 35 yo  Annual Wellness Exam:  To be performed by PCP, when appropriate. ROS  Pt denies hunger, cravings, lack of focus, fatigue, feeling weak, headaches, dizziness, light headedness, nausea, vomiting, diarrhea, constipation, indigestion, rapid heart rate, SOB, low blood sugar, feeling cold, hair loss, rash, fluid retention, leg aches, difficulty sleeping, irritability, mood swings, or other associated symptoms. Review of Systems negative except as noted above in HPI. PHYSICAL EXAMINATION    Visit Vitals  BP (!) 150/94 (BP 1 Location: Right arm)   Pulse 76   Temp 97.9 °F (36.6 °C)   Resp 20   Ht 5' 4\" (1.626 m)   Wt 251 lb 4.8 oz (114 kg)   SpO2 98%   BMI 43.14 kg/m²           Weight Metrics 11/15/2021 11/15/2021 11/1/2021 10/19/2021 10/19/2021 10/8/2021 9/24/2021   Weight - 251 lb 4.8 oz 251 lb 8 oz - 249 lb 11.2 oz 251 lb 1.6 oz 253 lb 6.4 oz   Neck Circ (inches) 15 - - 15.75 - - -   Waist Measure Inches 45 - - 45 - - -   Body Fat % 46.4 - - 46.2 - - -   BMI - 43.14 kg/m2 43.17 kg/m2 - 42.86 kg/m2 43.1 kg/m2 43.5 kg/m2          Neck Circumference:  Acceptable range for M >16 inches, F>15 inches  Waist Circumference:  Acceptable range for M> 40 inches/102 cm, F > 35 inches, 88 cm  Body Fat: Acceptable range for M 18-24%, F 25-31%    General appearance - Well nourished. Well appearing. Well developed. No acute distress. Obese. Head - Normocephalic. Atraumatic. Eyes - pupils equal and reactive. Extraocular eye movements intact. Sclera anicteric. Ears - Hearing is grossly normal bilaterally. Nose - normal and patent. Mouth - mucous membranes with adequate moisture. Neck - supple. Midline trachea. No carotid bruits noted bilaterally. No thyromegaly noted. Chest - clear to auscultation bilaterally anteriorly and posteriorly. No wheezes. No rales or rhonchi. Breath sounds are symmetrical bilaterally. Unlabored respirations. Heart - normal rate. Regular rhythm. Normal S1, S2. No murmur noted. No rubs, clicks or gallops noted. Abdomen - soft and distended. No masses or organomegaly. No rebound, rigidity or guarding. Bowel sounds normal x 4 quadrants. No tenderness noted. Neurological - awake, alert and oriented to person, place, and time and event. Cranial nerves II through XII intact. Clear speech. Muscle strength is +5/5 x 4 extremities. Sensation is intact to light touch bilaterally. Steady gait. Heme/Lymph - peripheral pulses normal x 4 extremities. No peripheral edema is noted. Musculoskeletal - Intact x 4 extremities. No pain with movement. Back exam - normal range of motion. No CVA tenderness. No buffalo hump noted. Skin - no rashes, erythema, ecchymosis, lacerations, abrasions, suspicious moles noted. No skin tags or moles. No acanthosis nigricans noted in the axilla or neck. Psychological -   normal behavior, dress and thought processes. Good insight. Good eye contact. Normal affect. Appropriate mood. Normal speech.     DATA REVIEWED    Lab Results   Component Value Date/Time    WBC 7.9 07/24/2021 09:56 AM    HGB 13.1 07/24/2021 09:56 AM    HCT 39.6 07/24/2021 09:56 AM    MCV 82 07/24/2021 09:56 AM     Lab Results   Component Value Date/Time    Sodium 142 11/11/2021 12:08 PM    Potassium 4.4 11/11/2021 12:08 PM    Chloride 104 11/11/2021 12:08 PM    CO2 26 11/11/2021 12:08 PM    Glucose 92 11/11/2021 12:08 PM BUN 15 11/11/2021 12:08 PM    Creatinine 0.97 11/11/2021 12:08 PM    BUN/Creatinine ratio 15 11/11/2021 12:08 PM    GFR est AA 77 11/11/2021 12:08 PM    GFR est non-AA 66 11/11/2021 12:08 PM    Calcium 9.3 11/11/2021 12:08 PM    Bilirubin, total 1.2 11/11/2021 12:08 PM    Alk.  phosphatase 105 11/11/2021 12:08 PM    Protein, total 6.7 11/11/2021 12:08 PM    Albumin 4.4 11/11/2021 12:08 PM    A-G Ratio 1.9 11/11/2021 12:08 PM    ALT (SGPT) 25 11/11/2021 12:08 PM    AST (SGOT) 20 11/11/2021 12:08 PM     Lab Results   Component Value Date/Time    Hemoglobin A1c 5.4 07/24/2021 09:56 AM     Lab Results   Component Value Date/Time    TSH 2.500 11/11/2021 12:08 PM     Lab Results   Component Value Date/Time    Uric acid 4.2 11/11/2021 12:08 PM     Magnesium   Date Value Ref Range Status   11/11/2021 2.1 1.6 - 2.3 mg/dL Final   07/24/2021 2.0 1.6 - 2.3 mg/dL Final     Lab Results   Component Value Date/Time    Vitamin B12 304 07/24/2021 09:56 AM    Folate 6.4 07/24/2021 09:56 AM     Lab Results   Component Value Date/Time    VITAMIN D, 25-HYDROXY 37.9 11/11/2021 12:08 PM     Lab Results   Component Value Date/Time    Iron 57 07/24/2021 09:56 AM     Lab Results   Component Value Date/Time    Cholesterol, total 161 11/11/2021 12:08 PM    Cholesterol, Total 156 11/11/2021 12:08 PM    HDL Cholesterol 54 11/11/2021 12:08 PM    LDL, calculated 94 11/11/2021 12:08 PM    VLDL, calculated 13 11/11/2021 12:08 PM    Triglyceride 64 11/11/2021 12:08 PM   )  Results for orders placed or performed in visit on 07/13/21   NMR LIPOPROFILE WITH LIPIDS (WITHOUT GRAPH)     Status: Abnormal   Result Value Ref Range Status    LDL-P 2,240 (H) <1,000 nmol/L Final     Comment:                           Low                   < 1000                            Moderate         1000 - 1299                            Borderline-High  1300 - 1599                            High             1600 - 2000                            Very High             > 2000      LDL-C(NIH CALC) 162 (H) 0 - 99 mg/dL Final     Comment:                           Optimal               <  100                            Above optimal     100 -  129                            Borderline        130 -  159                            High              160 -  189                            Very high             >  189      HDL-C 49 >39 mg/dL Final    Triglycerides 103 0 - 149 mg/dL Final    Cholesterol, Total 229 (H) 100 - 199 mg/dL Final    HDL-P (Total) 32.9 >=30.5 umol/L Final    Small LDL-P 1,327 (H) <=527 nmol/L Final    LDL size 20.6 >20.5 nm Final     Comment:  ----------------------------------------------------------                   ** INTERPRETATIVE INFORMATION**                   PARTICLE CONCENTRATION AND SIZE                      <--Lower CVD Risk   Higher CVD Risk-->    LDL AND HDL PARTICLES   Percentile in Reference Population    HDL-P (total)        High     75th    50th    25th   Low                         >34.9    34.9    30.5    26.7   <26.7    Small LDL-P          Low      25th    50th    75th   High                         <117     117     527     839    >839    LDL Size   <-Large (Pattern A)->    <-Small (Pattern B)->                      23.0    20.6           20.5      19.0   ----------------------------------------------------------  Small LDL-P and LDL Size are associated with CVD risk, but not after  LDL-P is taken into account. LP-IR SCORE 59 (H) <=45 Final     Comment: INSULIN RESISTANCE MARKER      <--Insulin Sensitive    Insulin Resistant-->             Percentile in Reference Population  Insulin Resistance Score  LP-IR Score   Low   25th   50th   75th   High                <27   27     45     63     >63  LP-IR Score is inaccurate if patient is non-fasting. The LP-IR score is a laboratory developed index that has been  associated with insulin resistance and diabetes risk and should be  used as one component of a physician's clinical assessment. Narrative    Test(s) 709881-VVR-A; 438657-EGK-W; 998994-Pjhdhxjfdprgy; 231182-  Cholesterol, Total; 234642-XIT-I (Total); 884297-Small LDL-P; 347300-  LDL Size; 110414-JX-CS Score  was developed and its performance characteristics determined  by [facility]. It has not been cleared or approved by the Food  and Drug Administration. Performed at:  [address]  [id number]  : Sky Toth MD, Phone:  [phone]        EKG:  Not available    ASSESSMENT     ICD-10-CM ICD-9-CM    1. Class 3 severe obesity due to excess calories with serious comorbidity and body mass index (BMI) of 40.0 to 44.9 in adult ([facility]  E66.01 278.01     Z68.41 V85.41    2. Essential hypertension  I10 401.9    3. Chronic fatigue  R53.82 780.79    4. Dyslipidemia  E78.5 272.4    5. Obstructive sleep apnea syndrome  G47.33 327.23    6. Vitamin D deficiency  E55.9 268.9    7. Inadequate sleep hygiene  Z72.821 307.49    8. Postoperative hypothyroidism  E89.0 244.0    9. Lactose intolerance  E73.9 271.3    10. Insulin resistance  E88.81 277.7    11. Leg swelling  M79.89 729.81    12. Anemia associated with nutritional deficiency  D53.9 281.9    13. Nocturnal leg cramps  G47.62 327.52    14. Chronic pain of both knees  M25.561 719.46     M25.562 338.29     G89.29     15. S/P NEENA (total abdominal hysterectomy)  Z90.710 V88.01    16. BMI 40.0-44.9, adult ([facility]  Z68.41 V85.41    17. History of total thyroidectomy  E89.0 246.8        We discussed the expected course, resolution and complications of the diagnosis(es) in detail. WEIGHT MANAGEMENT PLAN    Chart was reviewed and updated during the office visit today. Shani Horowitz has fulfilled [facility] requirements this month by completing homework forms, attending educational classes, nurse triage visits and monthly provider appointments as agreed and remains in good standing.     Reviewed and appreciate registered dietician note for nutritional counseling. Patient has attended all requirements of the program over the past month and is in good standing. Reviewed and appreciate bi-monthly nurse visits and agree with documentation. Followed up on any patient concerns today. Emphasized the importance of the patient continuing care from current primary care provider and other specialists while participating in this weight management program.    Patient has full access to all our office notes, orders, lab results on [facility] and can provide them copies, if desired. Advised patient to follow up with pcp for any acute symptoms and Health Maintenance. Reviewed and discussed most recent lab results. Advised patient to sign a release to provide our program a copy of EKG if done elsewhere. Informed patient that an EKG will be performed for every 50 lbs of weight loss. Recheck pertinent labs every 3 months while participating in LCD using [employer]irection meal replacements, as discussed to identify electrolyte abnormalities and guide therapeutic approach. An order form for pertinent labs was given to patient today. Patient was advised to have the labwork performed 1 week prior to that appointment with me. Advised patient to sign up for [facility] and view lab results directly. Notify me by [facility] if any questions or concerns arise. Labs ordered today will be reviewed in detail at the next office visit and time allowed for any questions regarding the results. Discussed the patient's medications, BMI, anthropometric measures and goals with patient. Informed patient that weight loss goal of 5-10% in 6-12 months has shown significant improvement in obesity and its related health conditions including DM, heart disease, asthma. A key study published in Arthritis & Rheumatism of Overweight and Obese Adults with OA found that losing 1 pound of weight resulted in 4 pounds of pressure being removed from the knees.     Continue current medications as directed by prescribers. Identified and discussed medications the patient is taking that can cause weight gain or weight loss as recorded on patient's medication list.  Advised patient not to stop use of any without discussing with the prescribing provider. Recommended patient to ask prescribing providers to consider more weight neutral or weight negative options. Will monitor patient's weight and health with continued use of current medications. Supplement recommendations: Take OTC Magnesium 400 mg po at night prn sleep, muscle cramping, constipation. Take OTC vit B12 1000 mcg daily orally if taking Metformin or experiencing numbness and tingling. Take OTC Fish Oil 1000 mg with EPA and -1,000 mg daily if experiencing dry skin, low HDL. Use with caution if history of heartburn exists. Increase fiber products (I.e. fiber tea, fiber shakes) or try OTC Fiber products (I.e. Benefiber, Metamucil, psyllium, etc) if experiencing constipation. Take OTC Lactaid with meal replacements, if lactose intolerance history exists or symptoms begin. Referred patient to [facility] Patient Manual for recommended antidotes. If any new symptoms or side effects have been experienced once dietary approach is initiated, advised patient to notify our office. Dietary Prescription:    Recommended meal plan:  New Direction Low Calorie Dietary approach, 7239-7795 kcal/day, 2-3 meal replacements daily with 1 \"grocery\" meal.   Make sure proper daily calories are consumed for each meal.  Encouraged patient to stay within the recommended amount of calories per day   Reviewed nutrition and importance of regular protein intake and minimizing hidden carbohydrate sources.    Decrease simple carbohydrates in any regular meal allowed (white foods, sweet foods, sweet drinks and alcohol), increase green leafy vegetables and protein (lean meats and beans) with each meal.    Avoid fried foods and limit saturated fats. Do not skip meals. Eat meals within 3-4 hours to prevent low blood sugar events. Emphasized importance of drinking a minimum of 2 L (67 oz) of water daily up to half your body weight in ounces of water while utilizing this dietary approach to prevent dehydration and potential side effects. Avoid water enhancers or sugar-sweetened beverages including alcohol, juice, soda, lemonade. Try OTC water infusion recipes. Limit caffeine, will allow 1 8 oz cup of black coffee or green tea (to stimulate release of Adiponectin and promote fat burning.)     Exercise Prescription:   Emphasized importance of reducing sedentary time and performing physical activity at least 5 days a week. A goal of 30 minutes physical activity daily is recommended for health benefit and at least 60 minutes daily to prevent weight regain. During weight loss phase, no less than 75% of this time needs to be performing aerobic (i.e. Walking) activity with no more than 25% of the time performing resistance exercises (i.e. Weight lifting, strengthening, toning). During weight maintenance phase, 50% of the physical activity time needs to be spent performing aerobic activity with 50% of the total time performing resistance exercises. Behavior and Lifestyle Prescription:  Counseled patient on stressors, stress management and self-care approaches. If already receiving formal counseling please continue these sessions routinely. Go to ER if any thoughts or attempts of suicide are experienced. Referred patient to work with a counselor for further evaluation and intervention. Patient expressed appreciation. Sleep Prescription:   A goal of 7-9 hours nightly of uninterrupted sleep is recommended to turn off the Grehlin hormone to be released from the stomach and triggers appetite while promoting weight gain. Proper rest turns on Leptin hormone to be released from white adipose tissue and promotes weight loss. Avoid alcohol and caffeine 6-12 hours before bedtime to reduce risk of sleep disturbance and bladder irritation while asleep. Discussed snoring, symptoms of Sleep Apnea and improvements with weight loss. Strongly encouraged compliance with CPAP, if Sleep Apnea has been diagnosed. Advised patient to follow up with sleep specialist for every 25 pounds lost to see if CPAP settings need to be adjusted. Counseled patient on health topics:    Obesity, Insulin Resistance, LCD dietary approaches, benefits, contraindications, possible side effects to these diets and how to prevent poor outcomes (including staying hydrated, limit physical exercise while transitioning into this program, avoid skipping meals, etc), weight loss goals, sleep hygiene, barriers to losing weight and keeping weight off, strategies to overcome habits or challenges to improve or continue adherence. Reminded patients who are female gender and of reproductive age that with weight loss, they may become more fertile. Recommended the use of condoms or some reliable form of contraception if pregnancy is not desired. Notify our office immediately if patient becomes pregnant. Immunizations noted. Influenza and Covid-19 vaccines recommended, if patient has not had it. Obesity may increase risk for severe illness and death from Covid-19 disease. Offered empathy, encouragement, legitimation, prayers, partnership to patient. Praised patient for successes. Patient was offered a choice/choices in the treatment plan today. Patient expressed understanding with the diagnoses and the plan and agrees with recommendations. Return in about 1 month (around 12/15/2021) for ND LCD.      Total time:  35 mins spent in counseling, coordinating care, reviewing and discussing results, reviewing relevant documentation from other providers, completing relevant documentation, and discussing treatment plans in reference to The primary encounter diagnosis was Class 3 severe obesity due to excess calories with serious comorbidity and body mass index (BMI) of 40.0 to 44.9 in adult [facility]). Diagnoses of Essential hypertension, Chronic fatigue, Dyslipidemia, Obstructive sleep apnea syndrome, Vitamin D deficiency, Inadequate sleep hygiene, Postoperative hypothyroidism, Lactose intolerance, Insulin resistance, Leg swelling, Anemia associated with nutritional deficiency, Nocturnal leg cramps, Chronic pain of both knees, S/P NEENA (total abdominal hysterectomy), BMI 40.0-44.9, adult ([address] and History of total thyroidectomy were also pertinent to this visit. Turner Shreya, PA FOR ALLOWING ME THE PRIVILEGE TO PARTICIPATE IN THE CARE OF OUR MUTUAL PATIENT, Ms. Contreras WITH RESPECT TO WEIGHT MANAGEMENT. Terrie Trinidad DO, Diplomate KECIA MAYER    There are no Patient Instructions on file for this visit.

## 2021-12-02 NOTE — PROGRESS NOTES
Cara Cho presents for weekly or bi-monthly evaluation of Obesity Body mass index is 43.17 kg/m². Visit Vitals  BP (!) 156/88 (BP 1 Location: Right arm)   Pulse 78   Temp 97.9 °F (36.6 °C)   Resp 20   Ht 5' 4\" (1.626 m)   Wt 251 lb 8 oz (114.1 kg)   SpO2 97%   BMI 43.17 kg/m²       Wt Readings from Last 3 Encounters:   11/15/21 251 lb 4.8 oz (114 kg)   11/01/21 251 lb 8 oz (114.1 kg)   10/19/21 249 lb 11.2 oz (113.3 kg)       1. Class 3 severe obesity due to excess calories with serious comorbidity and body mass index (BMI) of 40.0 to 44.9 in adult [facility])         I have reviewed and agree with nurse documentation of Weekly Education Class nurse progress note for [facility] Weight 45 [facility] [place]). Cara Cho is compliant with program requirements and may continue participation utilizing the [place] [employer] meal replacements, as discussed. Patient has been advised to refer to the [facility] Patient Manual and notify us if any side effects to this meal plan are present and/or persist.  Cara Cho may continue the current dietary approach, care and follow up at the monthly office visit with the provider, as scheduled. Check BP twice weekly. Notify your pcp if it consistently is above 140/90 or below 90/60. Bring your blood pressure log to your next office visit. Decrease salt, fats, caffeine, alcohol in your diet. Increase water, fiber. Exercise 5 times weekly for 30 minutes daily as tolerated. Continue current medications, care and subspecialty follow up. Visit eye doctor yearly to check your eyes blood pressure related changes. Follow-up and Dispositions    · Return in about 2 weeks (around 11/15/2021) for weight check. Documentation true and accepted by Tahir Felix.  Odalys Arriaza., AOMANUELP, Diplomate KECIA MAYER

## 2021-12-15 ENCOUNTER — TELEPHONE (OUTPATIENT)
Dept: SURGERY | Age: 56
End: 2021-12-15

## 2022-01-18 ENCOUNTER — TRANSCRIBE ORDER (OUTPATIENT)
Dept: SCHEDULING | Age: 57
End: 2022-01-18

## 2022-01-18 DIAGNOSIS — Z12.31 VISIT FOR SCREENING MAMMOGRAM: Primary | ICD-10-CM

## 2022-02-23 ENCOUNTER — HOSPITAL ENCOUNTER (OUTPATIENT)
Dept: MAMMOGRAPHY | Age: 57
Discharge: HOME | End: 2022-02-23
Attending: FAMILY MEDICINE
Payer: COMMERCIAL

## 2022-02-23 DIAGNOSIS — Z12.31 VISIT FOR SCREENING MAMMOGRAM: ICD-10-CM

## 2022-02-23 PROCEDURE — 77063 BREAST TOMOSYNTHESIS BI: CPT

## 2022-03-18 PROBLEM — R53.82 CHRONIC FATIGUE: Status: ACTIVE | Noted: 2021-08-18

## 2022-03-18 PROBLEM — E88.81 INSULIN RESISTANCE: Status: ACTIVE | Noted: 2021-08-18

## 2022-03-18 PROBLEM — R63.4 WEIGHT LOSS: Status: ACTIVE | Noted: 2021-08-18

## 2022-03-19 PROBLEM — I10 ESSENTIAL HYPERTENSION: Status: ACTIVE | Noted: 2021-08-18

## 2022-03-19 PROBLEM — G89.29 CHRONIC PAIN OF BOTH KNEES: Status: ACTIVE | Noted: 2021-08-18

## 2022-03-19 PROBLEM — E89.0 POSTOPERATIVE HYPOTHYROIDISM: Status: ACTIVE | Noted: 2021-08-18

## 2022-03-19 PROBLEM — M25.561 CHRONIC PAIN OF BOTH KNEES: Status: ACTIVE | Noted: 2021-08-18

## 2022-03-19 PROBLEM — Z90.710 S/P TAH (TOTAL ABDOMINAL HYSTERECTOMY): Status: ACTIVE | Noted: 2021-09-15

## 2022-03-19 PROBLEM — E66.01 CLASS 3 SEVERE OBESITY DUE TO EXCESS CALORIES WITH SERIOUS COMORBIDITY AND BODY MASS INDEX (BMI) OF 40.0 TO 44.9 IN ADULT (HCC): Status: ACTIVE | Noted: 2021-08-18

## 2022-03-19 PROBLEM — E89.0 HISTORY OF TOTAL THYROIDECTOMY: Status: ACTIVE | Noted: 2021-09-15

## 2022-03-19 PROBLEM — G47.62 NOCTURNAL LEG CRAMPS: Status: ACTIVE | Noted: 2021-08-18

## 2022-03-19 PROBLEM — M25.562 CHRONIC PAIN OF BOTH KNEES: Status: ACTIVE | Noted: 2021-08-18

## 2022-03-20 PROBLEM — E78.5 DYSLIPIDEMIA: Status: ACTIVE | Noted: 2021-09-15

## 2022-05-29 ENCOUNTER — APPOINTMENT (OUTPATIENT)
Dept: GENERAL RADIOLOGY | Age: 57
End: 2022-05-29
Attending: STUDENT IN AN ORGANIZED HEALTH CARE EDUCATION/TRAINING PROGRAM
Payer: COMMERCIAL

## 2022-05-29 ENCOUNTER — HOSPITAL ENCOUNTER (EMERGENCY)
Age: 57
Discharge: HOME | End: 2022-05-29
Attending: STUDENT IN AN ORGANIZED HEALTH CARE EDUCATION/TRAINING PROGRAM
Payer: COMMERCIAL

## 2022-05-29 VITALS
RESPIRATION RATE: 15 BRPM | OXYGEN SATURATION: 98 % | HEIGHT: 64 IN | DIASTOLIC BLOOD PRESSURE: 88 MMHG | HEART RATE: 74 BPM | SYSTOLIC BLOOD PRESSURE: 170 MMHG | BODY MASS INDEX: 41.83 KG/M2 | TEMPERATURE: 98 F | WEIGHT: 245 LBS

## 2022-05-29 DIAGNOSIS — M25.512 ACUTE PAIN OF LEFT SHOULDER: Primary | ICD-10-CM

## 2022-05-29 DIAGNOSIS — R07.89 NON-CARDIAC CHEST PAIN: ICD-10-CM

## 2022-05-29 LAB
ALBUMIN SERPL-MCNC: 4.5 G/DL (ref 3.5–5.2)
ALBUMIN/GLOB SERPL: 1.7 {RATIO} (ref 1.1–2.2)
ALP SERPL-CCNC: 106 U/L (ref 35–104)
ALT SERPL-CCNC: 24 U/L (ref 10–35)
ANION GAP SERPL CALC-SCNC: 10 MMOL/L (ref 5–15)
AST SERPL-CCNC: 23 U/L (ref 10–35)
BASOPHILS # BLD: 0.1 K/UL (ref 0–0.1)
BASOPHILS NFR BLD: 1 % (ref 0–1)
BILIRUB SERPL-MCNC: 0.9 MG/DL (ref 0.2–1)
BUN SERPL-MCNC: 15 MG/DL (ref 6–20)
BUN/CREAT SERPL: 15 (ref 12–20)
CALCIUM SERPL-MCNC: 9.3 MG/DL (ref 8.6–10)
CHLORIDE SERPL-SCNC: 100 MMOL/L (ref 98–107)
CO2 SERPL-SCNC: 29 MMOL/L (ref 22–29)
CREAT SERPL-MCNC: 0.98 MG/DL (ref 0.5–0.9)
DIFFERENTIAL METHOD BLD: ABNORMAL
EOSINOPHIL # BLD: 0.2 K/UL (ref 0–0.4)
EOSINOPHIL NFR BLD: 2 % (ref 0–7)
ERYTHROCYTE [DISTWIDTH] IN BLOOD BY AUTOMATED COUNT: 13.9 % (ref 11.5–14.5)
GLOBULIN SER CALC-MCNC: 2.7 G/DL (ref 2–4)
GLUCOSE SERPL-MCNC: 75 MG/DL (ref 65–100)
HCT VFR BLD AUTO: 38.8 % (ref 35–47)
HGB BLD-MCNC: 13.2 G/DL (ref 11.5–16)
IMM GRANULOCYTES # BLD AUTO: 0 K/UL (ref 0–0.04)
IMM GRANULOCYTES NFR BLD AUTO: 0 % (ref 0–0.5)
LYMPHOCYTES # BLD: 3 K/UL (ref 0.8–3.5)
LYMPHOCYTES NFR BLD: 23 % (ref 12–49)
MAGNESIUM SERPL-MCNC: 1.9 MG/DL (ref 1.6–2.6)
MCH RBC QN AUTO: 26.8 PG (ref 26–34)
MCHC RBC AUTO-ENTMCNC: 34 G/DL (ref 30–36.5)
MCV RBC AUTO: 78.7 FL (ref 80–99)
MONOCYTES # BLD: 1.1 K/UL (ref 0–1)
MONOCYTES NFR BLD: 8 % (ref 5–13)
NEUTS SEG # BLD: 8.6 K/UL (ref 1.8–8)
NEUTS SEG NFR BLD: 66 % (ref 32–75)
NRBC # BLD: 0 K/UL (ref 0–0.01)
NRBC BLD-RTO: 0 PER 100 WBC
PLATELET # BLD AUTO: 327 K/UL (ref 150–400)
PMV BLD AUTO: 11.3 FL (ref 8.9–12.9)
POTASSIUM SERPL-SCNC: 4.1 MMOL/L (ref 3.5–5.1)
PROT SERPL-MCNC: 7.2 G/DL (ref 6.4–8.3)
RBC # BLD AUTO: 4.93 M/UL (ref 3.8–5.2)
SODIUM SERPL-SCNC: 139 MMOL/L (ref 136–145)
TROPONIN I BLD-MCNC: <0.04 NG/ML (ref 0–0.08)
WBC # BLD AUTO: 13.1 K/UL (ref 3.6–11)

## 2022-05-29 PROCEDURE — 99285 EMERGENCY DEPT VISIT HI MDM: CPT

## 2022-05-29 PROCEDURE — 93005 ELECTROCARDIOGRAM TRACING: CPT

## 2022-05-29 PROCEDURE — 36415 COLL VENOUS BLD VENIPUNCTURE: CPT

## 2022-05-29 PROCEDURE — 84484 ASSAY OF TROPONIN QUANT: CPT

## 2022-05-29 PROCEDURE — 85025 COMPLETE CBC W/AUTO DIFF WBC: CPT

## 2022-05-29 PROCEDURE — 74011250637 HC RX REV CODE- 250/637: Performed by: STUDENT IN AN ORGANIZED HEALTH CARE EDUCATION/TRAINING PROGRAM

## 2022-05-29 PROCEDURE — 83735 ASSAY OF MAGNESIUM: CPT

## 2022-05-29 PROCEDURE — 80053 COMPREHEN METABOLIC PANEL: CPT

## 2022-05-29 PROCEDURE — 71046 X-RAY EXAM CHEST 2 VIEWS: CPT

## 2022-05-29 RX ORDER — LOSARTAN POTASSIUM 50 MG/1
50 TABLET ORAL DAILY
COMMUNITY

## 2022-05-29 RX ORDER — GUAIFENESIN 100 MG/5ML
324 LIQUID (ML) ORAL
Status: COMPLETED | OUTPATIENT
Start: 2022-05-29 | End: 2022-05-29

## 2022-05-29 RX ORDER — AMLODIPINE BESYLATE 5 MG/1
5 TABLET ORAL DAILY
COMMUNITY

## 2022-05-29 RX ORDER — MECLIZINE HYDROCHLORIDE 25 MG/1
25 TABLET ORAL
COMMUNITY

## 2022-05-29 RX ADMIN — ASPIRIN 324 MG: 81 TABLET, CHEWABLE ORAL at 18:38

## 2022-05-29 NOTE — ED PROVIDER NOTES
Patient is a 16-year-old female presented to ED with left shoulder pain that radiates somewhat into the upper chest.  Atypical chest pain. No shortness of breath, diaphoresis. Patient has history of hypertension and obesity. No other significant risk factors. Low risk chest pain protocol indicated. The history is provided by the patient and the spouse. Chest Pain   This is a new problem. The current episode started 3 to 5 hours ago. The problem has not changed since onset. The problem occurs constantly. The pain is associated with normal activity. The pain is present in the left side (Left upper, shoulder). The pain is at a severity of 3/10. The pain is mild. The quality of the pain is described as sharp. The pain does not radiate. Pertinent negatives include no diaphoresis, no malaise/fatigue, no nausea, no shortness of breath and no vomiting. She has tried nothing for the symptoms. The treatment provided no relief. Risk factors include obesity and hypertension. Her past medical history does not include PE. Pertinent negatives include no cardiac catheterization. Past Medical History:   Diagnosis Date    Anemia 2016    due to fibroids.  Asthma     Degenerative arthritis of knee, bilateral     Dr. José Miguel Dangelo High cholesterol     Hypertension 2014    Left knee injury 2016    Dr. José Miguel Dangelo Leg swelling     Bilaterally    Sleep apnea     on cpap    Uterine fibroid     Vitamin D deficiency        Past Surgical History:   Procedure Laterality Date    HX BUNIONECTOMY      HX  SECTION      HX COLONOSCOPY  2018    [facility]. w colon polypectomy. Due q 5 yrs.  HX HYSTERECTOMY  2017    HX THYROIDECTOMY  2006    due to benign nodules.          Family History:   Problem Relation Age of Onset    Heart Disease Mother         CHF    Hypertension Mother     Diabetes Father     Heart Disease Father     Hypertension Father     Gout Father     Heart Attack Father 80    Diabetes Paternal Grandfather     Obesity Paternal Grandmother     Heart Disease Sister         Heart palpatations ER visit 2018    No Known Problems Maternal Grandmother     No Known Problems Maternal Grandfather     Breast Cancer Maternal Aunt 68       Social History     Socioeconomic History    Marital status:      Spouse name: Not on file    Number of children: Not on file    Years of education: Not on file    Highest education level: Not on file   Occupational History    Occupation: Lead      Employer: [employer]   Tobacco Use    Smoking status: Passive Smoke Exposure - Never Smoker    Smokeless tobacco: Never Used    Tobacco comment: lived with smoker mom x years til 2009   Vaping Use    Vaping Use: Never used   Substance and Sexual Activity    Alcohol use: Yes     Alcohol/week: 0.0 standard drinks     Comment: Socially    Drug use: Never    Sexual activity: Yes     Partners: Male     Birth control/protection: Surgical     Comment: NEENA   Other Topics Concern    Not on file   Social History Narrative    Not on file     Social Determinants of Health     Financial Resource Strain:     Difficulty of Paying Living Expenses: Not on file   Food Insecurity:     Worried About Running Out of Food in the Last Year: Not on file    George of Food in the Last Year: Not on file   Transportation Needs:     Lack of Transportation (Medical): Not on file    Lack of Transportation (Non-Medical):  Not on file   Physical Activity:     Days of Exercise per Week: Not on file    Minutes of Exercise per Session: Not on file   Stress:     Feeling of Stress : Not on file   Social Connections:     Frequency of Communication with Friends and Family: Not on file    Frequency of Social Gatherings with Friends and Family: Not on file    Attends Adventism Services: Not on file    Active Member of Clubs or Organizations: Not on file    Attends Club or Organization Meetings: Not on file   Emilia Deng Marital Status: Not on file   Intimate Partner Violence:     Fear of Current or Ex-Partner: Not on file    Emotionally Abused: Not on file    Physically Abused: Not on file    Sexually Abused: Not on file   Housing Stability:     Unable to Pay for Housing in the Last Year: Not on file    Number of [place] in the Last Year: Not on file    Unstable Housing in the Last Year: Not on file         ALLERGIES: Ciprofloxacin, Doxycycline, Erythromycin, and Esgic [butalbital-acetaminophen-caff]    Review of Systems   Constitutional: Negative. Negative for diaphoresis and malaise/fatigue. HENT: Negative. Eyes: Negative. Respiratory: Negative. Negative for shortness of breath. Cardiovascular: Positive for chest pain. Gastrointestinal: Negative. Negative for nausea and vomiting. Endocrine: Negative. Genitourinary: Negative. Musculoskeletal: Positive for arthralgias. Skin: Negative. Allergic/Immunologic: Negative. Neurological: Negative. Hematological: Negative. Psychiatric/Behavioral: Negative. Vitals:    05/29/22 1833   BP: (!) 181/91   Pulse: 76   Resp: 15   Temp: 98.1 °F (36.7 °C)   SpO2: 100%   Weight: 111.1 kg (245 lb)   Height: 5' 4\" (1.626 m)            Physical Exam  Vitals and nursing note reviewed. Constitutional:       General: She is not in acute distress. Appearance: Normal appearance. HENT:      Head: Normocephalic and atraumatic. Right Ear: External ear normal.      Left Ear: External ear normal.      Nose: Nose normal.   Eyes:      Extraocular Movements: Extraocular movements intact. Conjunctiva/sclera: Conjunctivae normal.   Cardiovascular:      Rate and Rhythm: Normal rate. Pulses: Normal pulses. Radial pulses are 2+ on the right side and 2+ on the left side. Heart sounds: Normal heart sounds. Pulmonary:      Effort: Pulmonary effort is normal.      Breath sounds: Normal breath sounds.    Chest:      Chest wall: No deformity or tenderness. Abdominal:      General: Abdomen is flat. There is no distension. Tenderness: There is no abdominal tenderness. Musculoskeletal:         General: No deformity or signs of injury. Normal range of motion. Cervical back: Normal range of motion and neck supple. No tenderness. Right lower leg: No edema. Left lower leg: No edema. Comments: Patient has full range of motion of left shoulder without pain. Skin:     General: Skin is warm and dry. Capillary Refill: Capillary refill takes less than 2 seconds. Neurological:      General: No focal deficit present. Mental Status: She is alert and oriented to person, place, and time. Psychiatric:         Attention and Perception: Attention normal.         Mood and Affect: Mood normal.         Behavior: Behavior normal.          [facility]  ED Course as of 05/29/22 1938   Sun May 29, 2022   1823 EKG interpretation:   Rhythm: normal sinus rhythm; and regular . Rate (approx.): 75; Axis: normal; Intervals: normal ; ST/T wave: normal; EKG documented and interpreted by Alison Salcido MD, Emergency Medicine.     [AL]   4262 7:15 PM  Change of shift. Care of patient taken over from Dr. Angeline Salcido; H&P reviewed, bedside handoff complete. Awaiting Labs. Atypical chest pain, low risk heart score, Cp>3hr. [ZD]   1916 Troponin-I (POC): <0.04 [ZD]      ED Course User Index  [AL] Erick Stevens MD  [ZD] Joshua Figueroa MD     LABORATORY RESULTS:  Labs Reviewed   CBC WITH AUTOMATED DIFF - Abnormal; Notable for the following components:       Result Value    WBC 13.1 (*)     MCV 78.7 (*)     ABS. NEUTROPHILS 8.6 (*)     ABS. MONOCYTES 1.1 (*)     All other components within normal limits   METABOLIC PANEL, COMPREHENSIVE   MAGNESIUM   POC TROPONIN-I       IMAGING RESULTS:  XR CHEST PA LAT   Final Result   No acute process.               MEDICATIONS GIVEN:  Medications   aspirin chewable tablet 324 mg (324 mg Oral Given 5/29/22 8542)       Differential diagnosis: Shoulder pain, atypical chest pain, low risk chest pain, ACS    ED physician interpretation of imaging: Without focal pneumonia, mild mediastinum or hemopneumothorax. ED physician interpretation of EKG: No STEMI. See my interpretation EKG and ED course above. ED physician interpretation of laboratory results: Negative for ACS. Mild leukocytosis. Other lab work pending    MDM: Patient is a 62-year-old female presented ED with left shoulder pain and possible atypical chest pain. Work-up for ACS unremarkable on chest x-ray, troponin and EKG. Additional lab work pending. Most likely this is muscle skeletal pain. Likely discharged with cardiology follow-up and symptomatic management for MSK pain. Signs of neurodeficit with shoulder pain. Doubt stroke. IMPRESSION:  1. Acute pain of left shoulder    2. Non-cardiac chest pain        DISPOSITION:  7:17 PM  Change of shift. Care of patient signed over to Dr. Gurea Mazariegos. Handoff complete. Xavier Payment.  Bernard Parker MD      Procedures

## 2022-05-29 NOTE — ED NOTES
.  ED Course as of 05/29/22 1953   Many May 29, 2022   1823 EKG interpretation:   Rhythm: normal sinus rhythm; and regular . Rate (approx.): 75; Axis: normal; Intervals: normal ; ST/T wave: normal; EKG documented and interpreted by Alexis Keita. Ashley Palma MD, Emergency Medicine.     [AL]   7262 7:15 PM  Change of shift. Care of patient taken over from Dr. Ashley Palma; H&P reviewed, bedside handoff complete. Awaiting Labs. Atypical chest pain, low risk heart score, Cp>3hr. [ZD]   1916 Troponin-I (POC): <0.04 [ZD]      ED Course User Index  [AL] Vanesa Pearson MD  [ZD] Roxann Foley MD       Discussed the discharge impression and any labs and the results with the patient. Answered any questions and addressed any concerns. Discussed the importance of following up with their primary care provider and/or specialist.  Discussed signs or symptoms that would warrant return back to the ER for further evaluation. The patient is agreeable with discharge.           Recent Results (from the past 24 hour(s))   EKG, 12 LEAD, INITIAL    Collection Time: 05/29/22  6:21 PM   Result Value Ref Range    Ventricular Rate 75 BPM    Atrial Rate 75 BPM    P-R Interval 152 ms    QRS Duration 84 ms    Q-T Interval 410 ms    QTC Calculation (Bezet) 457 ms    Calculated P Axis 73 degrees    Calculated R Axis 6 degrees    Calculated T Axis 37 degrees    Diagnosis       Normal sinus rhythm  Possible Anterior infarct , age undetermined  Abnormal ECG  No previous ECGs available     CBC WITH AUTOMATED DIFF    Collection Time: 05/29/22  6:49 PM   Result Value Ref Range    WBC 13.1 (H) 3.6 - 11.0 K/uL    RBC 4.93 3.80 - 5.20 M/uL    HGB 13.2 11.5 - 16.0 g/dL    HCT 38.8 35.0 - 47.0 %    MCV 78.7 (L) 80.0 - 99.0 FL    MCH 26.8 26.0 - 34.0 PG    MCHC 34.0 30.0 - 36.5 g/dL    RDW 13.9 11.5 - 14.5 %    PLATELET 868 412 - 423 K/uL    MPV 11.3 8.9 - 12.9 FL    NRBC 0.0 0  WBC    ABSOLUTE NRBC 0.00 0.00 - 0.01 K/uL    NEUTROPHILS 66 32 - 75 % LYMPHOCYTES 23 12 - 49 %    MONOCYTES 8 5 - 13 %    EOSINOPHILS 2 0 - 7 %    BASOPHILS 1 0 - 1 %    IMMATURE GRANULOCYTES 0 0.0 - 0.5 %    ABS. NEUTROPHILS 8.6 (H) 1.8 - 8.0 K/UL    ABS. LYMPHOCYTES 3.0 0.8 - 3.5 K/UL    ABS. MONOCYTES 1.1 (H) 0.0 - 1.0 K/UL    ABS. EOSINOPHILS 0.2 0.0 - 0.4 K/UL    ABS. BASOPHILS 0.1 0.0 - 0.1 K/UL    ABS. IMM. GRANS. 0.0 0.00 - 0.04 K/UL    DF AUTOMATED     METABOLIC PANEL, COMPREHENSIVE    Collection Time: 05/29/22  6:49 PM   Result Value Ref Range    Sodium 139 136 - 145 mmol/L    Potassium 4.1 3.5 - 5.1 mmol/L    Chloride 100 98 - 107 mmol/L    CO2 29 22 - 29 mmol/L    Anion gap 10 5 - 15 mmol/L    Glucose 75 65 - 100 mg/dL    BUN 15 6 - 20 MG/DL    Creatinine 0.98 (H) 0.50 - 0.90 MG/DL    BUN/Creatinine ratio 15 12 - 20      GFR est AA >60 >60 ml/min/1.73m2    GFR est non-AA 59 (L) >60 ml/min/1.73m2    Calcium 9.3 8.6 - 10.0 MG/DL    Bilirubin, total 0.9 0.2 - 1.0 MG/DL    ALT (SGPT) 24 10 - 35 U/L    AST (SGOT) 23 10 - 35 U/L    Alk. phosphatase 106 (H) 35 - 104 U/L    Protein, total 7.2 6.4 - 8.3 g/dL    Albumin 4.5 3.5 - 5.2 g/dL    Globulin 2.7 2.0 - 4.0 g/dL    A-G Ratio 1.7 1.1 - 2.2     MAGNESIUM    Collection Time: 05/29/22  6:49 PM   Result Value Ref Range    Magnesium 1.9 1.6 - 2.6 mg/dL   POC TROPONIN-I    Collection Time: 05/29/22  6:55 PM   Result Value Ref Range    Troponin-I (POC) <0.04 0.00 - 0.08 ng/mL       XR CHEST PA LAT    Result Date: 5/29/2022  EXAM:  XR CHEST PA LAT INDICATION: Chest pain COMPARISON: None. TECHNIQUE: Frontal and lateral chest views FINDINGS: The cardiomediastinal contours are within normal limits. The lungs and pleural spaces are clear. There is no pneumothorax. The bones and upper abdomen are unremarkable. No acute process.

## 2022-05-31 LAB
ATRIAL RATE: 75 BPM
CALCULATED P AXIS, ECG09: 73 DEGREES
CALCULATED R AXIS, ECG10: 6 DEGREES
CALCULATED T AXIS, ECG11: 37 DEGREES
DIAGNOSIS, 93000: NORMAL
P-R INTERVAL, ECG05: 152 MS
Q-T INTERVAL, ECG07: 410 MS
QRS DURATION, ECG06: 84 MS
QTC CALCULATION (BEZET), ECG08: 457 MS
VENTRICULAR RATE, ECG03: 75 BPM